# Patient Record
Sex: MALE | Race: BLACK OR AFRICAN AMERICAN | Employment: OTHER | URBAN - METROPOLITAN AREA
[De-identification: names, ages, dates, MRNs, and addresses within clinical notes are randomized per-mention and may not be internally consistent; named-entity substitution may affect disease eponyms.]

---

## 2017-02-07 PROBLEM — Z95.828 ARTERIOVENOUS GRAFT FOR HEMODIALYSIS IN PLACE, PRIMARY: Chronic | Status: ACTIVE | Noted: 2017-02-07

## 2017-02-13 ENCOUNTER — HOSPITAL ENCOUNTER (OUTPATIENT)
Dept: LAB | Age: 77
Discharge: HOME | End: 2017-02-13
Attending: UROLOGY
Payer: MEDICARE

## 2017-02-13 ENCOUNTER — HOSPITAL ENCOUNTER (OUTPATIENT)
Dept: CT IMAGING | Age: 77
Discharge: HOME | End: 2017-02-13
Attending: UROLOGY
Payer: MEDICARE

## 2017-02-13 DIAGNOSIS — R31.0 GROSS HEMATURIA: ICD-10-CM

## 2017-02-13 PROCEDURE — 74011000258 HC RX REV CODE- 258: Performed by: UROLOGY

## 2017-02-13 PROCEDURE — 74178 CT ABD&PLV WO CNTR FLWD CNTR: CPT

## 2017-02-13 PROCEDURE — 74011636320 HC RX REV CODE- 636/320: Performed by: UROLOGY

## 2017-02-13 RX ORDER — SODIUM CHLORIDE 0.9 % (FLUSH) 0.9 %
10 SYRINGE (ML) INJECTION
Status: COMPLETED | OUTPATIENT
Start: 2017-02-13 | End: 2017-02-13

## 2017-02-13 RX ADMIN — SODIUM CHLORIDE 100 ML: 900 INJECTION, SOLUTION INTRAVENOUS at 11:50

## 2017-02-13 RX ADMIN — IOVERSOL 100 ML: 741 INJECTION INTRA-ARTERIAL; INTRAVENOUS at 11:49

## 2017-02-13 RX ADMIN — Medication 10 ML: at 11:50

## 2017-09-27 ENCOUNTER — APPOINTMENT (RX ONLY)
Dept: URBAN - METROPOLITAN AREA CLINIC 349 | Facility: CLINIC | Age: 77
Setting detail: DERMATOLOGY
End: 2017-09-27

## 2017-09-27 DIAGNOSIS — L82.1 OTHER SEBORRHEIC KERATOSIS: ICD-10-CM

## 2017-09-27 DIAGNOSIS — L20.89 OTHER ATOPIC DERMATITIS: ICD-10-CM

## 2017-09-27 DIAGNOSIS — L85.3 XEROSIS CUTIS: ICD-10-CM

## 2017-09-27 PROBLEM — E13.9 OTHER SPECIFIED DIABETES MELLITUS WITHOUT COMPLICATIONS: Status: ACTIVE | Noted: 2017-09-27

## 2017-09-27 PROBLEM — L20.84 INTRINSIC (ALLERGIC) ECZEMA: Status: ACTIVE | Noted: 2017-09-27

## 2017-09-27 PROCEDURE — ? PRESCRIPTION

## 2017-09-27 PROCEDURE — 99213 OFFICE O/P EST LOW 20 MIN: CPT

## 2017-09-27 PROCEDURE — ? COUNSELING

## 2017-09-27 RX ORDER — TRIAMCINOLONE ACETONIDE 1 MG/G
CREAM TOPICAL
Qty: 1 | Refills: 8 | Status: ERX

## 2017-09-27 RX ORDER — AMMONIUM LACTATE 120 MG/G
LOTION TOPICAL
Qty: 1 | Refills: 6 | Status: ERX

## 2017-09-27 ASSESSMENT — LOCATION DETAILED DESCRIPTION DERM
LOCATION DETAILED: RIGHT MID-UPPER BACK
LOCATION DETAILED: LEFT SUPERIOR LATERAL UPPER BACK
LOCATION DETAILED: LEFT DISTAL POSTERIOR THIGH
LOCATION DETAILED: RIGHT DISTAL POSTERIOR UPPER ARM
LOCATION DETAILED: LEFT MID-UPPER BACK
LOCATION DETAILED: RIGHT SUPERIOR UPPER BACK

## 2017-09-27 ASSESSMENT — LOCATION ZONE DERM
LOCATION ZONE: TRUNK
LOCATION ZONE: LEG
LOCATION ZONE: ARM

## 2017-09-27 ASSESSMENT — LOCATION SIMPLE DESCRIPTION DERM
LOCATION SIMPLE: RIGHT UPPER ARM
LOCATION SIMPLE: LEFT POSTERIOR THIGH
LOCATION SIMPLE: LEFT UPPER BACK
LOCATION SIMPLE: RIGHT UPPER BACK

## 2018-01-01 ENCOUNTER — HOSPITAL ENCOUNTER (OUTPATIENT)
Dept: CT IMAGING | Age: 78
Discharge: HOME | End: 2018-12-14
Attending: INTERNAL MEDICINE
Payer: MEDICARE

## 2018-01-01 DIAGNOSIS — Z86.010 PERSONAL HISTORY OF COLONIC POLYPS: ICD-10-CM

## 2018-01-01 DIAGNOSIS — K56.2 VOLVULUS (HCC): ICD-10-CM

## 2018-01-01 DIAGNOSIS — Z53.8 PROCEDURE AND TREATMENT NOT CARRIED OUT FOR OTHER REASONS: ICD-10-CM

## 2018-01-01 PROCEDURE — 74261 CT COLONOGRAPHY DX: CPT

## 2018-06-18 PROBLEM — E11.3599 TYPE 2 DIABETES MELLITUS WITH PROLIFERATIVE RETINOPATHY (HCC): Status: ACTIVE | Noted: 2018-06-18

## 2018-08-29 ENCOUNTER — HOSPITAL ENCOUNTER (OUTPATIENT)
Dept: MRI IMAGING | Age: 78
Discharge: HOME | End: 2018-08-29
Attending: INTERNAL MEDICINE
Payer: MEDICARE

## 2018-08-29 DIAGNOSIS — F03.90 DEMENTIA WITHOUT BEHAVIORAL DISTURBANCE, UNSPECIFIED DEMENTIA TYPE: ICD-10-CM

## 2018-08-29 PROCEDURE — 70551 MRI BRAIN STEM W/O DYE: CPT

## 2018-08-30 NOTE — PROGRESS NOTES
MRI had changes of arteriosclerosis in small peripheral blood vessels and is common for age--no stroke or tumor

## 2018-09-26 ENCOUNTER — APPOINTMENT (RX ONLY)
Dept: URBAN - METROPOLITAN AREA CLINIC 349 | Facility: CLINIC | Age: 78
Setting detail: DERMATOLOGY
End: 2018-09-26

## 2018-09-26 DIAGNOSIS — L20.89 OTHER ATOPIC DERMATITIS: ICD-10-CM

## 2018-09-26 DIAGNOSIS — L82.1 OTHER SEBORRHEIC KERATOSIS: ICD-10-CM

## 2018-09-26 DIAGNOSIS — L85.3 XEROSIS CUTIS: ICD-10-CM

## 2018-09-26 DIAGNOSIS — D22 MELANOCYTIC NEVI: ICD-10-CM

## 2018-09-26 DIAGNOSIS — L82.0 INFLAMED SEBORRHEIC KERATOSIS: ICD-10-CM

## 2018-09-26 PROBLEM — D22.5 MELANOCYTIC NEVI OF TRUNK: Status: ACTIVE | Noted: 2018-09-26

## 2018-09-26 PROBLEM — I48.91 UNSPECIFIED ATRIAL FIBRILLATION: Status: ACTIVE | Noted: 2018-09-26

## 2018-09-26 PROBLEM — L20.84 INTRINSIC (ALLERGIC) ECZEMA: Status: ACTIVE | Noted: 2018-09-26

## 2018-09-26 PROCEDURE — 99213 OFFICE O/P EST LOW 20 MIN: CPT | Mod: 25

## 2018-09-26 PROCEDURE — ? PRESCRIPTION

## 2018-09-26 PROCEDURE — ? LIQUID NITROGEN

## 2018-09-26 PROCEDURE — A4550 SURGICAL TRAYS: HCPCS

## 2018-09-26 PROCEDURE — 11301 SHAVE SKIN LESION 0.6-1.0 CM: CPT | Mod: 59

## 2018-09-26 PROCEDURE — ? SHAVE REMOVAL

## 2018-09-26 PROCEDURE — 88305 TISSUE EXAM BY PATHOLOGIST: CPT

## 2018-09-26 PROCEDURE — ? COUNSELING

## 2018-09-26 PROCEDURE — ? PATHOLOGY BILLING

## 2018-09-26 PROCEDURE — 17110 DESTRUCTION B9 LES UP TO 14: CPT

## 2018-09-26 RX ORDER — TRIAMCINOLONE ACETONIDE 1 MG/G
CREAM TOPICAL
Qty: 1 | Refills: 8 | Status: ERX

## 2018-09-26 RX ORDER — AMMONIUM LACTATE 120 MG/G
LOTION TOPICAL
Qty: 1 | Refills: 6 | Status: ERX

## 2018-09-26 ASSESSMENT — LOCATION SIMPLE DESCRIPTION DERM
LOCATION SIMPLE: RIGHT UPPER ARM
LOCATION SIMPLE: RIGHT SUPERIOR MUCOSAL LIP
LOCATION SIMPLE: RIGHT SUPERIOR LIP
LOCATION SIMPLE: LEFT UPPER BACK
LOCATION SIMPLE: LEFT POSTERIOR THIGH
LOCATION SIMPLE: RIGHT UPPER BACK
LOCATION SIMPLE: LEFT UPPER BACK

## 2018-09-26 ASSESSMENT — LOCATION ZONE DERM
LOCATION ZONE: TRUNK
LOCATION ZONE: LEG
LOCATION ZONE: ARM
LOCATION ZONE: LIP
LOCATION ZONE: TRUNK
LOCATION ZONE: LIP

## 2018-09-26 ASSESSMENT — LOCATION DETAILED DESCRIPTION DERM
LOCATION DETAILED: RIGHT MID-UPPER BACK
LOCATION DETAILED: RIGHT DISTAL POSTERIOR UPPER ARM
LOCATION DETAILED: LEFT SUPERIOR UPPER BACK
LOCATION DETAILED: RIGHT SUPERIOR UPPER BACK
LOCATION DETAILED: RIGHT SUPERIOR VERMILION LIP
LOCATION DETAILED: RIGHT SUPERIOR MUCOSAL LIP
LOCATION DETAILED: LEFT SUPERIOR LATERAL UPPER BACK
LOCATION DETAILED: LEFT MID-UPPER BACK
LOCATION DETAILED: LEFT SUPERIOR UPPER BACK
LOCATION DETAILED: LEFT DISTAL POSTERIOR THIGH

## 2018-09-26 NOTE — PROCEDURE: LIQUID NITROGEN
Render Post-Care Instructions In Note?: no
Include Z78.9 (Other Specified Conditions Influencing Health Status) As An Associated Diagnosis?: Yes
Detail Level: Detailed
Number Of Freeze-Thaw Cycles: 1 freeze-thaw cycle
Post-Care Instructions: I reviewed with the patient in detail post-care instructions. Patient is to wear sunprotection, and avoid picking at any of the treated lesions. Pt may apply Vaseline to crusted or scabbing areas.
Medical Necessity Information: It is in your best interest to select a reason for this procedure from the list below. All of these items fulfill various CMS LCD requirements except the new and changing color options.
Consent: The patient's consent was obtained including but not limited to risks of crusting, scabbing, blistering, scarring, darker or lighter pigmentary change, recurrence, incomplete removal and infection.
Medical Necessity Clause: This procedure was medically necessary because the lesions that were treated were:

## 2018-09-26 NOTE — PROCEDURE: PATHOLOGY BILLING
Immunohistochemistry ([id number] and [id number]) billing is not performed here. Please use the Immunohistochemistry Stain Billing plan to accomplish this. Immunohistochemistry ([id number] and [id number]) billing is not performed here. Please use the Immunohistochemistry Stain Billing plan to accomplish this.

## 2018-09-26 NOTE — PROCEDURE: SHAVE REMOVAL
Was A Bandage Applied: Yes
Detail Level: Detailed
Anesthesia Type: 2% lidocaine with epinephrine
Hemostasis: Electrocautery
Biopsy Method: Dermablade
Wound Care: Vaseline
Size Of Lesion In Cm (Required): 0.8
X Size Of Lesion In Cm (Optional): 0
Post-Care Instructions: I reviewed with the patient in detail post-care instructions. Patient is to keep the biopsy site dry overnight, and then apply vaseline twice daily until healed. Patient may apply hydrogen peroxide soaks to remove any crusting. After the procedure, the patient was observed for 5-10 minutes and was oriented to person, place and time and demied feeling dizzy, queasy, and stated that they did not feel that they were going to faint.
Consent was obtained from the patient. The risks and benefits to therapy were discussed in detail. Specifically, the risks of infection, scarring, bleeding, prolonged wound healing, incomplete removal, allergy to anesthesia, nerve injury and recurrence were addressed. Prior to the procedure, the treatment site was clearly identified and confirmed by the patient. All components of Universal Protocol/PAUSE Rule completed.
Medical Necessity Information: It is in your best interest to select a reason for this procedure from the list below. All of these items fulfill various CMS LCD requirements except the new and changing color options.
Billing Type: Third-Party Bill
Accession #: [id number]
Medical Necessity Clause: This procedure was medically necessary because the lesion that was treated was: changing
Bill [id number] For Specimen Handling/Conveyance To Laboratory?: no
Anesthesia Volume In Cc: 0.5
Notification Instructions: Patient will be notified of biopsy results. However, patient instructed to call the office if not contacted within 2 weeks.

## 2018-10-29 ENCOUNTER — HOSPITAL ENCOUNTER (OUTPATIENT)
Dept: PHYSICAL THERAPY | Age: 78
Discharge: HOME | End: 2018-10-29
Attending: INTERNAL MEDICINE
Payer: MEDICARE

## 2018-10-29 DIAGNOSIS — R05.9 COUGH: ICD-10-CM

## 2018-10-29 PROCEDURE — G8996 SWALLOW CURRENT STATUS: HCPCS | Performed by: SPEECH-LANGUAGE PATHOLOGIST

## 2018-10-29 PROCEDURE — G8997 SWALLOW GOAL STATUS: HCPCS | Performed by: SPEECH-LANGUAGE PATHOLOGIST

## 2018-10-29 PROCEDURE — 92610 EVALUATE SWALLOWING FUNCTION: CPT | Performed by: SPEECH-LANGUAGE PATHOLOGIST

## 2018-10-29 NOTE — THERAPY EVALUATION
Domingo Reilly  : 1940  [place]  Secondary: Elia [address] [place] [address], [address], [place] [address].  [phone]   [phone]         OUTPATIENT SPEECH LANGUAGE PATHOLOGY: Initial Assessment  ICD-10: Treatment Diagnosis: Oropharyngeal Dysphagia R13.12  REFERRING PHYSICIAN: Ethel Gonzalez MD MD Orders: Evaluate and Treat  PAST MEDICAL HISTORY:   Mr. Lelia Perkins is a 69 y.o. male who  has a past medical history of Abnormal cardiovascular function study, Abnormal EKG, Allergic rhinitis, Anemia, ARF (acute renal failure) ([address] Atrial fibrillation ([address] Atrial fibrillation with rapid ventricular response ([address] CAD (Coronary Artery Disease), Native Coronary Artery, CABG x4, Cancer ([address] Chest pain, Chronic constipation, Chronic diastolic heart failure ([address] Chronic kidney disease, Cough, Diabetes mellitus with background retinopathy ([address] Diabetes Mellitus, insulin dependent, Dyspnea on exertion, Erectile dysfunction/impotence, Gastroenteritis, acute, Gastroesophageal reflux, Glaucoma, HTN (hypertension), Hyperlipidemia, ILD (interstitial lung disease) ([address] Long-term current use of steroids, Morbid obesity with body mass index of 45.0-49.9 in [facility]), Need for prophylactic antibiotic, ADLEN (Obstructive Sleep Apnea), uses home CPAP, Paroxysmal SVT/rapid atrial  (supraventricular tachycardia) ([address] Personal history of tobacco use, Pneumonia, Pneumonia, organism unspecified(486), Premature ventricular contraction, Reflux esophagitis - (GERD), Renal cancer ([address] Restrictive lung disease, Retinopathy, S/P CABG (coronary artery bypass graft), Status post kidney transplant, Transplant, kidney, Transplanted kidney, and URI (upper respiratory infection).   He also  has a past surgical history that includes hx cataract removal (, ); hx retinal detachment repair (, ); hx vascular access (1995); pr cabg, artery-vein, four (2002); pr transplant kidney+recip nephrec (6/12/1996); hx urological; hx amputation (2005); ANESTHESIA FOR ESOPHAGOGASTRODUODENOSCOPY (N/A, 5/9/2012); and NEPHRECTOMY LAPAROSCOPIC HAND ASSISTED (Left, 6/2/2010). MEDICAL/REFERRING DIAGNOSIS: Cough [R05]  DATE OF ONSET: 3-4 months   PRIOR LEVEL OF FUNCTION: requires assistance  PRECAUTIONS/ALLERGIES: Patient has no known allergies. ASSESSMENT:  Patient is a 69year old male who was referred for Dysphagia evaluation due coughing. He is accompanied by his wife who provides most of the case history. She reports that patient has been coughing with food and liquid for about 3-4 months. She indicated that it first started with Lasix increase and patient started losing weight. She stated that she called patient's physician who told patient to decrease the Lasix as long as his weight stayed below 350lbs. She reports that he's been given cough medicine in the past for these coughing episodes. Just a few weeks ago, he coughed up some red tinged sputum that too her looked like the cough syrup and not blood. She reports that he coughs more at dinner and at night and less with lunch. He does have sleep apnea in which he wears a CPAP, occasional allergies in which he will occasionally take Claritin in addition to occasionally using the inhaler for wheezing. He does have GERD in which he currently takes Nexium. She reports that he does have some memory loss. She does hear a \"rattle' in his throat at times and states that the rattle improves if he throat clears  Based on the objective data described below, the patient presents with some clinical s/sx of Dysphagia. OME was completed and revealed redness along pharyngeal wall. Decreased lingual lateralization and ROM to the left. He was given three Dysphagia assessment tools.  He was given the Eating Assessment Tool (EAT-10) which helps measure swallowing problems with ranges from 0=no problem and 4=severe problem. A score greater than 3 would indicate a swallowing problem. He scored an 8. Then he was given the Cough Severity Index (CSI) which allows patient's to describe their cough and the effect of their coughing on their lives with a range from 0-never to 4=always. A total score of 3 or below is considered normal and higher than 3, your cough is impacting your quality of life. He scored an 8. Lastly, he was given Reflux Symptom Index which allows to measure if swallowing symptoms are indicative of reflux. Rating scale ranges from 0-5, with 0= no problem, 5= severe. RSI of greater than or equal to 13 in clinically significant therefore a RSI >13 may be indicative of significant reflux disease. He scored an 25 which would indicate reflux. Patient was then given trials of thin liquids via cup, puree, mixed and solids. (+) changes in vocal quality observed with thin liquids via cup. Cued throat clear helped improve. Prolonged mastication observed with puree, mixed and solids with lingual residue requiring liquid was observed with solids. Given all the above assessment tools in addition to clinical bedside, he would benefit from MBSS to further assess his swallow function for proper diet and or strategies. ST does feel that some of his symptoms could be reflux related and maybe that Nexium is no longer effective for patient. That being said, after MBSS he would benefit from GI consult for GERD management. Patient currently sees Dr. Lisa Blanco and wife believes he has a follow up in January. Also patient recently diagnosed with memory issues and that as well could effect is overall swallow function as well. Goals will be added and or updated pending MBSS results and recommendations. Wife and patient aware of the above aforementioned and all are in agreement. Also reviewed standard reflux precautions. All verbalized understanding.      Patient will benefit from skilled intervention to address the below impairments. ?????? ? ? This section established at most recent assessment??????????  PROBLEM LIST (Impairments causing functional limitations):  1. Dysphagia  GOALS: (Goals have been discussed and agreed upon with patient.)  SHORT-TERM FUNCTIONAL GOALS: Time Frame: 3 months  Patient will complete MBSS at 100% participation. Patient will complete laryngeal exercises with Min A with 85% accuracy  DISCHARGE GOALS: Time Frame: 12 weeks  1. Patient will tolerate least restrictive diet without signs/symptoms of aspiration at discharge for safe swallow function. '  REHABILITATION POTENTIAL FOR STATED GOALS: [address]:  Patient will benefit from skilled intervention to address the following impairments. RECOMMENDATIONS AND PLANNED INTERVENTIONS (Benefits and precautions of therapy have been discussed with the patient.):  · continue prescribed diet  MEDICATIONS:  · With liquid  COMPENSATORY STRATEGIES/MODIFICATIONS INCLUDING:  · Small sips and bites  · Sit upright at 90 degrees  OTHER RECOMMENDATIONS (including follow up treatment recommendations): · Family training/education  · Laryngeal exercises  · MBSS  RECOMMENDED DIET MODIFICATIONS DISCUSSED WITH:  · Family  · Patient  TREATMENT PLAN EFFECTIVE DATES: 10/29/2018 TO 1/27/2019 (90 days). FREQUENCY/DURATION: Continue to follow patient 1 time a week for 90 days to address above goals. Regarding Shawna Ariza's therapy, I certify that the treatment plan above will be carried out by a therapist or under their direction. Thank you for this referral,  HALIMA Renteria Ed CCC-SLP                  Referring Physician Signature: Sundar Soares MD    Date      SUBJECTIVE:  Alert  Present Symptoms: Dysphagia    Pain Intensity 1: 0  Current Medications:   Current Outpatient Medications on File Prior to Encounter   Medication Sig Dispense Refill    donepezil (ARICEPT) 10 mg tablet Take 1 Tab by mouth nightly.  30 Tab 6    HYDROcodone-homatropine (HYCODAN) 5-1.5 mg/5 mL syrup Take 5 mL by mouth three (3) times daily as needed.  aspirin delayed-release 81 mg tablet Take 81 mg by mouth daily.  ALPHAGAN P 0.1 % ophthalmic solution Administer 1 Drop to right eye two (2) times a day.  dorzolamide-timolol (COSOPT) 22.3-6.8 mg/mL ophthalmic solution Administer 1 Drop to both eyes two (2) times a day.  albuterol (PROAIR HFA) 90 mcg/actuation inhaler Take 2 Puffs by inhalation every four (4) hours as needed for Wheezing. 1 Inhaler 11    dilTIAZem CD (CARDIZEM CD) 120 mg ER capsule Take 1 Cap by mouth daily. 30 Cap 5    atorvastatin (LIPITOR) 40 mg tablet Take 1 Tab by mouth nightly. 30 Tab 5    Insulin Syringe-Needle U-100 1 mL 31 gauge x 5/16 syrg Strength: 1 cc, short needle ; Form: .; SIG: take as directed  4 x per day      glucose blood VI test strips (ONETOUCH ULTRA TEST) strip To check blood sugar 4 x per day. Dx E11.42      azelastine (ASTELIN) 137 mcg (0.1 %) nasal spray 1 [place] by Both Nostrils route two (2) times a day. Use in each nostril as directed (Patient taking differently: 1 Spray by Both Nostrils route two (2) times daily as needed. Use in each nostril as directed) 1 Bottle 11    apixaban (ELIQUIS) 5 mg tablet Take 1 Tab by mouth two (2) times a day. 60 Tab 11    sotalol (BETAPACE) 120 mg tablet Take 1 Tab by mouth every twelve (12) hours. 180 Tab 3    amLODIPine (NORVASC) 2.5 mg tablet Take 1 Tab by mouth daily. 90 Tab 3    insulin lispro (HUMALOG) 100 unit/mL injection 20/34/44 and sliding scale      NEXIUM 40 mg capsule TAKE 1 CAPSULE (40MG) BY ORAL ROUTE EVERY AM BEFORE A MEAL (INS WILL COVER 4/10/17)  6    furosemide (LASIX) 80 mg tablet Take 1 Tab by mouth daily. Alternating with 2 (Patient taking differently: Take 80 mg by mouth two (2) times a day.) 45 Tab 11    cycloSPORINE modified (NEORAL) 100 mg capsule Take 1 Cap by mouth two (2) times a day.  HOLD TODAY'S (8/27/16) DOSES (Patient taking differently: Take 100 mg by mouth two (2) times a day.) 1 Cap 0    ferrous sulfate 325 mg (65 mg iron) tablet Take 65 mg by mouth Daily (before breakfast). Indications: three times weekly MWF      multivitamin (ONE A DAY) tablet Strength: Multiple Vitamins; Form: tablet; SIG: take 1 tab(s) orally once a day      polyethylene glycol (MIRALAX) 17 gram packet Take 17 g by mouth every Monday, Wednesday, Friday.  cpap machine kit by Does Not Apply route. 17 cm qhs      acetaminophen (TYLENOL EXTRA STRENGTH) 500 mg tablet Take 1,000 mg by mouth every six (6) hours as needed for Pain.  cycloSPORINE modified (NEORAL) 25 mg capsule Take 25 mg by mouth daily.  loratadine (CLARITIN) 10 mg tablet Take 10 mg by mouth daily as needed.  mycophenolate sodium (MYFORTIC) 180 mg EC tablet Take 180 mg by mouth two (2) times a day.  CALCIUM CARBONATE/VITAMIN D3 (CALCIUM 600 + D PO) Take 1 Tab by mouth daily.  captopril (CAPOTEN) 50 mg tablet Take 50 mg by mouth three (3) times daily.  predniSONE (DELTASONE) 10 mg tablet Take 10 mg by mouth daily (with breakfast).  insulin glargine (LANTUS) 100 unit/mL injection 54 Units by SubCUTAneous route two (2) times a day. No current facility-administered medications on file prior to encounter. Date Last Reviewed: 10/29/18  Current Dietary Status:  Regular      History of reflux:  YES    Reflux medication:Nexium  Social History/Home Situation:       Work/Activity History: Disabled    OBJECTIVE:  Objective Measure: Tool Used: National Outcomes Measurement System: Functional Communication Measures: SWALLOWING  Score:  Initial: 5 Most Recent: X (Date: -- )   Interpretation of Tool: This measure describes the change in functional communication status subsequent to speech-language pathology treatment of patients with dysphagia.  o Level 1:  Individual is not able to swallow anything safely by mouth.  All nutrition and hydration is received through non-oral means (e.g., nasogastric tube, PEG). o Level 2: Individual is not able to swallow safely by mouth for nutrition and hydration, but may take some consistency with consistent maximal cues in therapy only. Alternative method of feeding required. o Level 3:  Alternative method of feeding required as individual takes less than 50% of nutrition and hydration by mouth, and/or swallowing is safe with consistent use of moderate cues to use compensatory strategies and/or requires maximum diet restriction. o Level 4:  Swallowing is safe, but usually requires moderate cues to use compensatory strategies, and/or the individual has moderate diet restrictions and/or still requires tube feeding and/or oral supplements. o Level 5:  Swallowing is safe with minimal diet restriction and/or occasionally requires minimal cueing to use compensatory strategies. The individual may occasionally self-cue. All nutrition and hydration needs are met by mouth at mealtime. o Level 6:  Swallowing is safe, and the individual eats and drinks independently and may rarely require minimal cueing. The individual usually self-cues when difficulty occurs. May need to avoid specific food items (e.g., popcorn and nuts), or require additional time (due to dysphagia). o Level 7: The individuals ability to eat independently is not limited by swallow function. Swallowing would be safe and efficient for all consistencies. Compensatory strategies are effectively used when needed. Score Level 7 Level 6 Level 5 Level 4 Level 3 Level 2 Level 1   Modifier CH CI CJ CK CL CM CN   ?  Swallowing:     - CURRENT STATUS: CJ - 20%-39% impaired, limited or restricted    - GOAL STATUS:  CH - 0% impaired, limited or restricted    - D/C STATUS:  ---------------To be determined---------------    Respiratory Status:      Room Air  CXR Results:Clear  MRI/CT Results:N/A  Oral Motor Structure/Speech:  Oral-Motor Structure/Motor Speech  Labial: No impairment  Dentition: Upper dentures, Natural  Oral Hygiene: fair  Lingual: Decreased rate  Velum: Palate (comment)(decreased)  Mandible: No impairment    Cognitive and Communication Status:  Neurologic State: Alert  Orientation Level: Oriented to person;Disoriented to situation  Cognition: Follows commands;Memory loss  Perception: Appears intact  Perseveration: No perseveration noted  Safety/Judgement: Not assessed    BEDSIDE SWALLOW EVALUATION  Oral Assessment:  Oral Assessment  Labial: No impairment  Dentition: Upper dentures; Natural  Oral Hygiene: fair  Lingual: Decreased rate  Velum: Palate (comment)(decreased)  Mandible: No impairment  Gag Reflex: Hyperactive  P.O. Trials:  Patient Position: upright in chair    The patient was given teaspoon to tablespoon   amounts of the following:   Consistency Presented: Mixed consistency;Puree; Solid; Thin liquid  How Presented: Self-fed/presented;Cup/sip;Spoon;Straw;Successive swallows    ORAL PHASE:  Bolus Acceptance: No impairment  Bolus Formation/Control: Impaired  Propulsion: Delayed (# of seconds)  Type of Impairment: Mastication(prolonged)  Oral Residue: Less than 10% of bolus; Lingual(with solids only)    PHARYNGEAL PHASE:  Initiation of Swallow: Delayed (# of seconds)  Laryngeal Elevation: Weak  Aspiration Signs/Symptoms: Change vocal quality  Vocal Quality: No impairment           Pharyngeal Phase Characteristics: Double swallow;Easily fatigued ; Suspected pharyngeal residue;Poor endurance    OTHER OBSERVATIONS:  Rate/bite size: WNL   Endurance:  Questionable   Coments:      TREATMENT:    (In addition to Assessment/Re-Assessment sessions the following treatments were rendered)  Assessment/Reassessment only, no treatment provided today        LARYNGEAL / PHARYNGEAL EXERCISES: __________________________________________________________________________________________________  Treatment Assessment:  Dysphagia evaluation completed. Progression/Medical Necessity:   · Skilled intervention continues to be required due to clinical s/sx of Dysphagia. Compliance with Program/Exercises: Will assess as treatment progresses. Reason for Continuation of Services/Other Comments:  · Patient continues to require skilled intervention due to clinical s/sx of Dysphagia . Recommendations/Intent for next treatment session: \"Treatment next visit will focus on goals\". Total Treatment Duration:  Time In: 1300  Time Out: Antonina Triana. Rosalind Marrero

## 2018-10-29 NOTE — PROGRESS NOTES
Outpatient Rehab Services  Referral Form/Physician Order  Central Scheduling Fax: [phone]  Speak to a :  Call [phone]   Please review and co-sign (electronically) OR sign and return to the below indicated clinic's fax number if you agree with this request.  Thank you! NAME:  Krista Lee SSN:  [id number] :  1940   ADDRESS:  [address] [place] [address] 2 Daytime Phone:  [phone] (home)There is no such number on file (mobile). Diagnosis & Date of Onset:  Cough [R05] Special Precautions:   Frequency:  [] to be determined by therapist after evaluation   OR   ____ X per week X ____ weeks    Services    [] Evaluate & Treat  [x] Special Orders_________MBSS_______________   [] Physical Therapy  [] Occupational Therapy   [] Speech Therapy  [x] MBS w/ Speech Therapy    Specialized Programs    [] Aquatic Therapy [] Lymphedema [] Oncology Rehab   [] Balance Rehab [] Parkinson's Program [] Osteoporosis   [] Fibromyalgia [] Motion Analysis [] Spine Rehab   [] Industrial Rehab [] Hand & Upper Extremity [] Sports Injury Rehab    [] Urinary Incontinence     Locations    @ [address] [place] [address] [address], [address]  [place] [address] [address] [facility]  Ph: [phone]  Fax: [phone] @ [address] [place], [place] [address] [facility],[address]  [place], [address]  Ph: [phone]  Fax: [phone] @ [address], [address] [place] [address]  Ph: [phone]  Fax: [phone]        @ [address] [place] [address] [address]  Ph: [phone]  Fax: [phone] @ [address] [place], [place] [address], [address]   Ph: [phone]  Fax: [phone] @ [address] [place] [address]  [address]  [place], [address]  Ph: [phone]  Fax: [phone]        @ [address] [place] [address]  Ph: [phone]  Fax: [phone] @ [address], [address] [place] [address]  Ph: [phone]  Fax: [phone] @ [address], [address], [address]   Ph: [phone]         @ [address] [place] [address], [address] [place] [address]  Ph: [phone]  Fax: [phone]      This section is not needed if signing electronically   I certify that I have examined the above patient and outpatient rehab services are medically necessary.    ___________________________________________           Signature of Physician/Provider    ___________________________________________            Practice Name   ______________________   Date    ______________________   Referral Contact

## 2018-11-08 NOTE — THERAPY EVALUATION
Ritu Worthy  : 1940  [place]  Secondary: Elia Smith 75 at [facility], [facility], [address]  [phone]   [phone]        OUTPATIENT SPEECH LANGUAGE PATHOLOGY: MODIFIED BARIUM SWALLOW    ICD-10: Treatment Diagnosis: dysphagia, oropharyngeal 13.12  DATE: 2018  REFERRING PHYSICIAN: Jesús Michelle MD MD Orders: modified   PAST MEDICAL HISTORY:    Mr. Deana Gutierrez is a 69 y.o. male who  has a past medical history of Abnormal cardiovascular function study, Abnormal EKG, Allergic rhinitis, Anemia, ARF (acute renal failure) ([facility]), Atrial fibrillation ([address] Atrial fibrillation with rapid ventricular response ([address] CAD (Coronary Artery Disease), Native Coronary Artery, CABG x4, Cancer ([address] Chest pain, Chronic constipation, Chronic diastolic heart failure ([address] Chronic kidney disease, Cough, Diabetes mellitus with background retinopathy ([address] Diabetes Mellitus, insulin dependent, Dyspnea on exertion, Erectile dysfunction/impotence, Gastroenteritis, acute, Gastroesophageal reflux, Glaucoma, HTN (hypertension), Hyperlipidemia, ILD (interstitial lung disease) ([address] Long-term current use of steroids, Morbid obesity with body mass index of 45.0-49.9 in [facility]), Need for prophylactic antibiotic, ALDEN (Obstructive Sleep Apnea), uses home CPAP, Paroxysmal SVT/rapid atrial  (supraventricular tachycardia) ([address] Personal history of tobacco use, Pneumonia, Pneumonia, organism unspecified(486), Premature ventricular contraction, Reflux esophagitis - (GERD), Renal cancer ([address] Restrictive lung disease, Retinopathy, S/P CABG (coronary artery bypass graft), Status post kidney transplant, Transplant, kidney, Transplanted kidney, and URI (upper respiratory infection).   He also  has a past surgical history that includes hx cataract removal (, ); hx retinal detachment repair (, ); hx vascular access (1995); pr cabg, artery-vein, four (2002); pr transplant kidney+recip nephrec (6/12/1996); hx urological; hx amputation (2005); ANESTHESIA FOR ESOPHAGOGASTRODUODENOSCOPY (N/A, 5/9/2012); and NEPHRECTOMY LAPAROSCOPIC HAND ASSISTED (Left, 6/2/2010). MEDICAL/REFERRING DIAGNOSIS: Dysphagia, unspecified [R13.10]  DATE OF ONSET: June 2018  PRIOR LEVEL OF FUNCTION: with spouse  PRECAUTIONS/ALLERGIES: Patient has no allergy information on record. ASSESSMENT/PLAN OF CARE:  Pt's spouse reported pt has had a cough since June. She reported he eats after finishing meals at times. Based on the objective data described below, the patient presents with min oropharyngeal dysphagia. Premature spillage occurred to the valleculae with pudding and to the pyriform sinuses with all remaining consistencies. Max delays were with chewable textures and were 7-8 seconds. Deep, transient penetration occurred during the swallow with thin and nectar thick liquids via cup and straw. Penetration was not as deep with thin via straw as compared to thin via cup. However, straw sips of nectar were deep. No penetration with honey regardless of presentation. A bolus hold and effortful swallow with thin via cup did not affect penetration. No aspiration observed. No pharyngeal residue. Recommend continuing with a regular diet as penetration was transient. Recommend ST for 1-2 sessions to establish a home exercise program.  Discussed with pt and his wife with verbal understanding expressed. Patient will benefit from skilled intervention to address the above impairments.     RECOMMENDATIONS AND PLANNED INTERVENTIONS (Benefits and precautions of therapy have been discussed with the patient.):  · continue prescribed diet  MEDICATIONS:  · With liquid  COMPENSATORY STRATEGIES/MODIFICATIONS INCLUDING:  · None  OTHER RECOMMENDATIONS (including follow up treatment recommendations):   · Laryngeal exercises    Thank you for this referral,  RENETTA Sexton, CCC-SLP            SUBJECTIVE:  Pt cooperative. Spouse present. Present Symptoms: coughing after meals       Current Dietary Status:  Regular    Radiologist: Dr. Ruby Hdez  History of reflux:  [x]YES     []NO      Reflux medication: Nexium   Social History/Home Situation: residing with spouse      Work/Activity History: retired     OBJECTIVE:  Objective Measure: Tool Used: National Outcomes Measurement System: Functional Communication Measures: SWALLOWING  Score:  Initial: 6 Most Recent: X (Date: -- )   Interpretation of Tool: This measure describes the change in functional communication status subsequent to speech-language pathology treatment of patients with dysphagia.  o Level 1:  Individual is not able to swallow anything safely by mouth. All nutrition and hydration is received through non-oral means (e.g., nasogastric tube, PEG). o Level 2: Individual is not able to swallow safely by mouth for nutrition and hydration, but may take some consistency with consistent maximal cues in therapy only. Alternative method of feeding required. o Level 3:  Alternative method of feeding required as individual takes less than 50% of nutrition and hydration by mouth, and/or swallowing is safe with consistent use of moderate cues to use compensatory strategies and/or requires maximum diet restriction. o Level 4:  Swallowing is safe, but usually requires moderate cues to use compensatory strategies, and/or the individual has moderate diet restrictions and/or still requires tube feeding and/or oral supplements. o Level 5:  Swallowing is safe with minimal diet restriction and/or occasionally requires minimal cueing to use compensatory strategies. The individual may occasionally self-cue. All nutrition and hydration needs are met by mouth at mealtime. o Level 6:  Swallowing is safe, and the individual eats and drinks independently and may rarely require minimal cueing.  The individual usually self-cues when difficulty occurs. May need to avoid specific food items (e.g., popcorn and nuts), or require additional time (due to dysphagia). o Level 7: The individuals ability to eat independently is not limited by swallow function. Swallowing would be safe and efficient for all consistencies. Compensatory strategies are effectively used when needed. Score Level 7 Level 6 Level 5 Level 4 Level 3 Level 2 Level 1   Modifier CH CI CJ CK CL CM CN   ?  Swallowing:     - CURRENT STATUS: CI - 1%-19% impaired, limited or restricted    - GOAL STATUS:  CH - 0% impaired, limited or restricted    - D/C STATUS:  CI - 1%-19% impaired, limited or restricted      Cognitive/Communication Status:  Mental Status  Neurologic State: Alert    Oral Assessment:  Oral Assessment  Dentition: Upper dentures(natural lower teeth)  Oral Hygiene: adequate    Vocal Quality: no impairment     Patient Viewed: Patient Position: upright in chair  Film Views: C-Arm, Lateral, Fluoro    Oral Prepatory:  The patient was given the following: Consistency Presented: Honey thick liquid, Mixed consistency, Nectar thick liquid, Pudding, Solid, Thin liquid  How Presented: Self-fed/presented, SLP-fed/presented, Cup/sip, Spoon, Straw, Successive swallows    Oral Phase:  Bolus Acceptance: No impairment  Bolus Formation/Control: Impaired  Propulsion: No impairment  Type of Impairment: Premature spillage  Oral Residue: None  Initiation of Swallow: Triggered at vallecula, Triggered at pyriform sinus(es)  Oral Phase Severity: Minimal    Pharyngeal Phase:  Timing: Pooling 1-5 sec, Pooling 6-10 sec  Decreased Tongue Base Retraction?: No  Laryngeal Elevation: WFL (within functional limits)  Penetration: Flash/transient  Aspiration/Timing: No evidence of aspiration  Aspiration/Penetration Score: 2 (Penetration/No residue-Contrast enters the airway penetrates, remains above the folds/cords, and is cleared)  Pharyngeal Symmetry: Not assessed  Pharyngeal Dysfunction: None  Pharyngeal Phase Severity: Minimal  Pharyngeal-Esophageal Segment: No impairment    Assessment only;  No treatment(s) provided today  __________________________________________________________________________________________________  Recommendations for treatment: laryngeal exercises  Total Treatment Duration:  Time In: 0922   Time Out: 31 RENETTA Howard, CCC-SLP

## 2018-11-09 ENCOUNTER — HOSPITAL ENCOUNTER (OUTPATIENT)
Dept: GENERAL RADIOLOGY | Age: 78
Discharge: HOME | End: 2018-11-09
Payer: MEDICARE

## 2018-11-09 DIAGNOSIS — R13.10 DYSPHAGIA, UNSPECIFIED: ICD-10-CM

## 2018-11-09 DIAGNOSIS — R13.12 OROPHARYNGEAL DYSPHAGIA: ICD-10-CM

## 2018-11-09 PROCEDURE — G8997 SWALLOW GOAL STATUS: HCPCS

## 2018-11-09 PROCEDURE — G8998 SWALLOW D/C STATUS: HCPCS

## 2018-11-09 PROCEDURE — 92611 MOTION FLUOROSCOPY/SWALLOW: CPT

## 2018-11-09 PROCEDURE — G8996 SWALLOW CURRENT STATUS: HCPCS

## 2018-11-09 PROCEDURE — 74011000255 HC RX REV CODE- 255: Performed by: INTERNAL MEDICINE

## 2018-11-09 PROCEDURE — 74230 X-RAY XM SWLNG FUNCJ C+: CPT

## 2018-11-09 RX ADMIN — BARIUM SULFATE 20 ML: 400 SUSPENSION ORAL at 10:00

## 2018-11-09 RX ADMIN — BARIUM SULFATE 15 ML: 400 PASTE ORAL at 10:01

## 2018-11-09 RX ADMIN — BARIUM SULFATE 20 ML: 400 SUSPENSION ORAL at 09:59

## 2018-11-09 RX ADMIN — BARIUM SULFATE 30 ML: 980 POWDER, FOR SUSPENSION ORAL at 10:00

## 2018-12-19 PROBLEM — D50.9 IRON DEFICIENCY ANEMIA: Status: ACTIVE | Noted: 2018-01-01

## 2018-12-19 PROBLEM — R93.3 ABNORMAL CT SCAN, COLON: Status: ACTIVE | Noted: 2018-01-01

## 2018-12-19 PROBLEM — Z12.11 SPECIAL SCREENING FOR MALIGNANT NEOPLASMS, COLON: Status: ACTIVE | Noted: 2018-01-01

## 2018-12-19 PROBLEM — R68.89 OTHER GENERAL SYMPTOMS AND SIGNS: Status: RESOLVED | Noted: 2018-01-01 | Resolved: 2018-01-01

## 2018-12-19 PROBLEM — K56.2 VOLVULUS (HCC): Status: ACTIVE | Noted: 2018-01-01

## 2018-12-19 PROBLEM — A04.72 ENTEROCOLITIS DUE TO CLOSTRIDIUM DIFFICILE: Status: ACTIVE | Noted: 2018-01-01

## 2018-12-19 PROBLEM — D13.1 BENIGN NEOPLASM OF STOMACH: Status: ACTIVE | Noted: 2018-01-01

## 2018-12-19 PROBLEM — K31.7 POLYP OF STOMACH AND DUODENUM: Status: ACTIVE | Noted: 2018-01-01

## 2018-12-19 PROBLEM — A04.72 ENTEROCOLITIS DUE TO CLOSTRIDIUM DIFFICILE: Status: RESOLVED | Noted: 2018-01-01 | Resolved: 2018-01-01

## 2018-12-19 PROBLEM — R68.89 OTHER GENERAL SYMPTOMS AND SIGNS: Status: ACTIVE | Noted: 2018-01-01

## 2018-12-19 PROBLEM — E66.01 OBESITY, MORBID (HCC): Status: ACTIVE | Noted: 2018-01-01

## 2018-12-19 PROBLEM — Z79.01 LONG TERM CURRENT USE OF ANTICOAGULANT: Status: ACTIVE | Noted: 2018-01-01

## 2019-01-01 ENCOUNTER — PATIENT OUTREACH (OUTPATIENT)
Dept: CASE MANAGEMENT | Age: 79
End: 2019-01-01

## 2019-01-01 ENCOUNTER — HOSPITAL ENCOUNTER (INPATIENT)
Age: 79
LOS: 3 days | Discharge: NURSING, REHAB OR LONG-TERM CARE | DRG: 698 | End: 2019-09-18
Attending: EMERGENCY MEDICINE | Admitting: INTERNAL MEDICINE
Payer: MEDICARE

## 2019-01-01 ENCOUNTER — APPOINTMENT (OUTPATIENT)
Dept: GENERAL RADIOLOGY | Age: 79
DRG: 698 | End: 2019-01-01
Attending: INTERNAL MEDICINE
Payer: MEDICARE

## 2019-01-01 ENCOUNTER — APPOINTMENT (OUTPATIENT)
Dept: GENERAL RADIOLOGY | Age: 79
DRG: 698 | End: 2019-01-01
Attending: NURSE PRACTITIONER
Payer: MEDICARE

## 2019-01-01 ENCOUNTER — APPOINTMENT (OUTPATIENT)
Dept: CT IMAGING | Age: 79
DRG: 698 | End: 2019-01-01
Attending: NURSE PRACTITIONER
Payer: MEDICARE

## 2019-01-01 ENCOUNTER — HOSPITAL ENCOUNTER (OUTPATIENT)
Dept: GENERAL RADIOLOGY | Age: 79
Discharge: HOME | End: 2019-06-04
Attending: INTERNAL MEDICINE

## 2019-01-01 ENCOUNTER — HOSPITAL ENCOUNTER (OUTPATIENT)
Dept: LAB | Age: 79
Discharge: HOME | End: 2019-06-04
Attending: INTERNAL MEDICINE
Payer: MEDICARE

## 2019-01-01 ENCOUNTER — APPOINTMENT (OUTPATIENT)
Dept: GENERAL RADIOLOGY | Age: 79
DRG: 698 | End: 2019-01-01
Attending: EMERGENCY MEDICINE
Payer: MEDICARE

## 2019-01-01 VITALS
HEIGHT: 74 IN | RESPIRATION RATE: 18 BRPM | WEIGHT: 315 LBS | DIASTOLIC BLOOD PRESSURE: 67 MMHG | SYSTOLIC BLOOD PRESSURE: 161 MMHG | HEART RATE: 60 BPM | OXYGEN SATURATION: 90 % | BODY MASS INDEX: 40.43 KG/M2 | TEMPERATURE: 97.5 F

## 2019-01-01 DIAGNOSIS — I50.32 CHRONIC DIASTOLIC HEART FAILURE (HCC): ICD-10-CM

## 2019-01-01 DIAGNOSIS — I50.9 ACUTE ON CHRONIC CONGESTIVE HEART FAILURE, UNSPECIFIED HEART FAILURE TYPE (HCC): ICD-10-CM

## 2019-01-01 DIAGNOSIS — N39.0 URINARY TRACT INFECTION WITH HEMATURIA, SITE UNSPECIFIED: Primary | ICD-10-CM

## 2019-01-01 DIAGNOSIS — Z94.0 TRANSPLANTED KIDNEY: ICD-10-CM

## 2019-01-01 DIAGNOSIS — R31.9 URINARY TRACT INFECTION WITH HEMATURIA, SITE UNSPECIFIED: Primary | ICD-10-CM

## 2019-01-01 DIAGNOSIS — N17.9 ACUTE RENAL FAILURE SUPERIMPOSED ON CHRONIC KIDNEY DISEASE, UNSPECIFIED CKD STAGE, UNSPECIFIED ACUTE RENAL FAILURE TYPE (HCC): ICD-10-CM

## 2019-01-01 DIAGNOSIS — I48.0 PAROXYSMAL ATRIAL FIBRILLATION (HCC): ICD-10-CM

## 2019-01-01 DIAGNOSIS — R06.02 SOB (SHORTNESS OF BREATH): ICD-10-CM

## 2019-01-01 DIAGNOSIS — N18.9 ACUTE RENAL FAILURE SUPERIMPOSED ON CHRONIC KIDNEY DISEASE, UNSPECIFIED CKD STAGE, UNSPECIFIED ACUTE RENAL FAILURE TYPE (HCC): ICD-10-CM

## 2019-01-01 DIAGNOSIS — I10 ESSENTIAL HYPERTENSION: ICD-10-CM

## 2019-01-01 DIAGNOSIS — R09.02 HYPOXIA: ICD-10-CM

## 2019-01-01 LAB
ABO + RH BLD: NORMAL
ALBUMIN SERPL-MCNC: 2.6 G/DL (ref 3.2–4.6)
ALBUMIN SERPL-MCNC: 3 G/DL (ref 3.2–4.6)
ALBUMIN/GLOB SERPL: 0.7 {RATIO} (ref 1.2–3.5)
ALBUMIN/GLOB SERPL: 0.8 {RATIO} (ref 1.2–3.5)
ALP SERPL-CCNC: 60 U/L (ref 50–136)
ALP SERPL-CCNC: 67 U/L (ref 50–136)
ALT SERPL-CCNC: 13 U/L (ref 12–65)
ALT SERPL-CCNC: 16 U/L (ref 12–65)
ANION GAP SERPL CALC-SCNC: 5 MMOL/L (ref 7–16)
ANION GAP SERPL CALC-SCNC: 7 MMOL/L (ref 7–16)
ANION GAP SERPL CALC-SCNC: 7 MMOL/L (ref 7–16)
ANION GAP SERPL CALC-SCNC: 8 MMOL/L (ref 7–16)
ANION GAP SERPL CALC-SCNC: 8 MMOL/L (ref 7–16)
APTT PPP: 29 SEC (ref 24.7–39.8)
AST SERPL-CCNC: 13 U/L (ref 15–37)
AST SERPL-CCNC: 18 U/L (ref 15–37)
ATRIAL RATE: 277 BPM
BACTERIA SPEC CULT: ABNORMAL
BACTERIA SPEC CULT: NORMAL
BACTERIA URNS QL MICRO: ABNORMAL /HPF
BASOPHILS # BLD: 0 K/UL (ref 0–0.2)
BASOPHILS NFR BLD: 0 % (ref 0–2)
BILIRUB SERPL-MCNC: 0.4 MG/DL (ref 0.2–1.1)
BILIRUB SERPL-MCNC: 0.5 MG/DL (ref 0.2–1.1)
BLOOD GROUP ANTIBODIES SERPL: NORMAL
BNP SERPL-MCNC: 393 PG/ML
BNP SERPL-MCNC: 496 PG/ML
BNP SERPL-MCNC: 508 PG/ML
BUN SERPL-MCNC: 25 MG/DL (ref 8–23)
BUN SERPL-MCNC: 26 MG/DL (ref 8–23)
BUN SERPL-MCNC: 28 MG/DL (ref 8–23)
BUN SERPL-MCNC: 32 MG/DL (ref 8–23)
BUN SERPL-MCNC: 34 MG/DL (ref 8–23)
CALCIUM SERPL-MCNC: 9 MG/DL (ref 8.3–10.4)
CALCIUM SERPL-MCNC: 9.1 MG/DL (ref 8.3–10.4)
CALCIUM SERPL-MCNC: 9.3 MG/DL (ref 8.3–10.4)
CALCIUM SERPL-MCNC: 9.4 MG/DL (ref 8.3–10.4)
CALCIUM SERPL-MCNC: 9.7 MG/DL (ref 8.3–10.4)
CALCULATED R AXIS, ECG10: -8 DEGREES
CALCULATED T AXIS, ECG11: 171 DEGREES
CASTS URNS QL MICRO: ABNORMAL /LPF
CHLORIDE SERPL-SCNC: 107 MMOL/L (ref 98–107)
CHLORIDE SERPL-SCNC: 108 MMOL/L (ref 98–107)
CHLORIDE SERPL-SCNC: 110 MMOL/L (ref 98–107)
CO2 SERPL-SCNC: 29 MMOL/L (ref 21–32)
CO2 SERPL-SCNC: 30 MMOL/L (ref 21–32)
CO2 SERPL-SCNC: 31 MMOL/L (ref 21–32)
CREAT SERPL-MCNC: 1.36 MG/DL (ref 0.8–1.5)
CREAT SERPL-MCNC: 1.5 MG/DL (ref 0.8–1.5)
CREAT SERPL-MCNC: 1.52 MG/DL (ref 0.8–1.5)
CREAT SERPL-MCNC: 1.56 MG/DL (ref 0.8–1.5)
CREAT SERPL-MCNC: 1.62 MG/DL (ref 0.8–1.5)
DIAGNOSIS, 93000: NORMAL
DIFFERENTIAL METHOD BLD: ABNORMAL
EOSINOPHIL # BLD: 0 K/UL (ref 0–0.8)
EOSINOPHIL # BLD: 0.1 K/UL (ref 0–0.8)
EOSINOPHIL NFR BLD: 0 % (ref 0.5–7.8)
EOSINOPHIL NFR BLD: 1 % (ref 0.5–7.8)
EPI CELLS #/AREA URNS HPF: ABNORMAL /HPF
ERYTHROCYTE [DISTWIDTH] IN BLOOD BY AUTOMATED COUNT: 18.6 % (ref 11.9–14.6)
ERYTHROCYTE [DISTWIDTH] IN BLOOD BY AUTOMATED COUNT: 18.8 % (ref 11.9–14.6)
ERYTHROCYTE [DISTWIDTH] IN BLOOD BY AUTOMATED COUNT: 18.8 % (ref 11.9–14.6)
ERYTHROCYTE [DISTWIDTH] IN BLOOD BY AUTOMATED COUNT: 18.9 % (ref 11.9–14.6)
ERYTHROCYTE [DISTWIDTH] IN BLOOD BY AUTOMATED COUNT: 19.2 % (ref 11.9–14.6)
GLOBULIN SER CALC-MCNC: 3.8 G/DL (ref 2.3–3.5)
GLOBULIN SER CALC-MCNC: 4 G/DL (ref 2.3–3.5)
GLUCOSE BLD STRIP.AUTO-MCNC: 120 MG/DL (ref 65–100)
GLUCOSE BLD STRIP.AUTO-MCNC: 168 MG/DL (ref 65–100)
GLUCOSE BLD STRIP.AUTO-MCNC: 179 MG/DL (ref 65–100)
GLUCOSE BLD STRIP.AUTO-MCNC: 183 MG/DL (ref 65–100)
GLUCOSE BLD STRIP.AUTO-MCNC: 219 MG/DL (ref 65–100)
GLUCOSE BLD STRIP.AUTO-MCNC: 229 MG/DL (ref 65–100)
GLUCOSE BLD STRIP.AUTO-MCNC: 240 MG/DL (ref 65–100)
GLUCOSE BLD STRIP.AUTO-MCNC: 242 MG/DL (ref 65–100)
GLUCOSE BLD STRIP.AUTO-MCNC: 254 MG/DL (ref 65–100)
GLUCOSE BLD STRIP.AUTO-MCNC: 260 MG/DL (ref 65–100)
GLUCOSE BLD STRIP.AUTO-MCNC: 306 MG/DL (ref 65–100)
GLUCOSE BLD STRIP.AUTO-MCNC: 312 MG/DL (ref 65–100)
GLUCOSE SERPL-MCNC: 154 MG/DL (ref 65–100)
GLUCOSE SERPL-MCNC: 191 MG/DL (ref 65–100)
GLUCOSE SERPL-MCNC: 236 MG/DL (ref 65–100)
GLUCOSE SERPL-MCNC: 62 MG/DL (ref 65–100)
GLUCOSE SERPL-MCNC: 93 MG/DL (ref 65–100)
GRAM STN SPEC: NORMAL
HCT VFR BLD AUTO: 34.7 % (ref 41.1–50.3)
HCT VFR BLD AUTO: 35.4 % (ref 41.1–50.3)
HCT VFR BLD AUTO: 35.7 % (ref 41.1–50.3)
HCT VFR BLD AUTO: 36.5 % (ref 41.1–50.3)
HCT VFR BLD AUTO: 38 % (ref 41.1–50.3)
HGB BLD-MCNC: 10.6 G/DL (ref 13.6–17.2)
HGB BLD-MCNC: 10.7 G/DL (ref 13.6–17.2)
HGB BLD-MCNC: 10.8 G/DL (ref 13.6–17.2)
HGB BLD-MCNC: 11 G/DL (ref 13.6–17.2)
HGB BLD-MCNC: 11.3 G/DL (ref 13.6–17.2)
IMM GRANULOCYTES # BLD AUTO: 0 K/UL (ref 0–0.5)
IMM GRANULOCYTES # BLD AUTO: 0.1 K/UL (ref 0–0.5)
IMM GRANULOCYTES NFR BLD AUTO: 0 % (ref 0–5)
IMM GRANULOCYTES NFR BLD AUTO: 1 % (ref 0–5)
INR PPP: 1.2
LACTATE BLD-SCNC: 1.46 MMOL/L (ref 0.5–1.9)
LACTATE SERPL-SCNC: 1.1 MMOL/L (ref 0.4–2)
LYMPHOCYTES # BLD: 0.6 K/UL (ref 0.5–4.6)
LYMPHOCYTES # BLD: 1.1 K/UL (ref 0.5–4.6)
LYMPHOCYTES # BLD: 1.2 K/UL (ref 0.5–4.6)
LYMPHOCYTES # BLD: 1.2 K/UL (ref 0.5–4.6)
LYMPHOCYTES # BLD: 1.6 K/UL (ref 0.5–4.6)
LYMPHOCYTES NFR BLD: 13 % (ref 13–44)
LYMPHOCYTES NFR BLD: 14 % (ref 13–44)
LYMPHOCYTES NFR BLD: 14 % (ref 13–44)
LYMPHOCYTES NFR BLD: 5 % (ref 13–44)
LYMPHOCYTES NFR BLD: 9 % (ref 13–44)
MAGNESIUM SERPL-MCNC: 2.2 MG/DL (ref 1.8–2.4)
MAGNESIUM SERPL-MCNC: 2.3 MG/DL (ref 1.8–2.4)
MAGNESIUM SERPL-MCNC: 2.4 MG/DL (ref 1.8–2.4)
MCH RBC QN AUTO: 27.9 PG (ref 26.1–32.9)
MCH RBC QN AUTO: 28 PG (ref 26.1–32.9)
MCH RBC QN AUTO: 28.1 PG (ref 26.1–32.9)
MCH RBC QN AUTO: 28.3 PG (ref 26.1–32.9)
MCH RBC QN AUTO: 28.4 PG (ref 26.1–32.9)
MCHC RBC AUTO-ENTMCNC: 29.7 G/DL (ref 31.4–35)
MCHC RBC AUTO-ENTMCNC: 30.1 G/DL (ref 31.4–35)
MCHC RBC AUTO-ENTMCNC: 30.2 G/DL (ref 31.4–35)
MCHC RBC AUTO-ENTMCNC: 30.3 G/DL (ref 31.4–35)
MCHC RBC AUTO-ENTMCNC: 30.5 G/DL (ref 31.4–35)
MCV RBC AUTO: 92 FL (ref 79.6–97.8)
MCV RBC AUTO: 92.2 FL (ref 79.6–97.8)
MCV RBC AUTO: 93.5 FL (ref 79.6–97.8)
MCV RBC AUTO: 94.1 FL (ref 79.6–97.8)
MCV RBC AUTO: 94.3 FL (ref 79.6–97.8)
MM INDURATION POC: 0 MM (ref 0–5)
MM INDURATION POC: NORMAL (ref 0–5)
MONOCYTES # BLD: 1 K/UL (ref 0.1–1.3)
MONOCYTES # BLD: 1.1 K/UL (ref 0.1–1.3)
MONOCYTES # BLD: 1.4 K/UL (ref 0.1–1.3)
MONOCYTES NFR BLD: 10 % (ref 4–12)
MONOCYTES NFR BLD: 10 % (ref 4–12)
MONOCYTES NFR BLD: 11 % (ref 4–12)
NEUTS SEG # BLD: 10.1 K/UL (ref 1.7–8.2)
NEUTS SEG # BLD: 6.5 K/UL (ref 1.7–8.2)
NEUTS SEG # BLD: 7.4 K/UL (ref 1.7–8.2)
NEUTS SEG # BLD: 8.5 K/UL (ref 1.7–8.2)
NEUTS SEG # BLD: 9.7 K/UL (ref 1.7–8.2)
NEUTS SEG NFR BLD: 74 % (ref 43–78)
NEUTS SEG NFR BLD: 75 % (ref 43–78)
NEUTS SEG NFR BLD: 75 % (ref 43–78)
NEUTS SEG NFR BLD: 80 % (ref 43–78)
NEUTS SEG NFR BLD: 84 % (ref 43–78)
NRBC # BLD: 0 K/UL (ref 0–0.2)
PLATELET # BLD AUTO: 233 K/UL (ref 150–450)
PLATELET # BLD AUTO: 239 K/UL (ref 150–450)
PLATELET # BLD AUTO: 239 K/UL (ref 150–450)
PLATELET # BLD AUTO: 242 K/UL (ref 150–450)
PLATELET # BLD AUTO: 280 K/UL (ref 150–450)
PMV BLD AUTO: 10.2 FL (ref 9.4–12.3)
PMV BLD AUTO: 10.4 FL (ref 9.4–12.3)
PMV BLD AUTO: 10.4 FL (ref 9.4–12.3)
PMV BLD AUTO: 10.5 FL (ref 9.4–12.3)
PMV BLD AUTO: 10.9 FL (ref 9.4–12.3)
POTASSIUM SERPL-SCNC: 3.7 MMOL/L (ref 3.5–5.1)
POTASSIUM SERPL-SCNC: 3.7 MMOL/L (ref 3.5–5.1)
POTASSIUM SERPL-SCNC: 3.9 MMOL/L (ref 3.5–5.1)
POTASSIUM SERPL-SCNC: 3.9 MMOL/L (ref 3.5–5.1)
POTASSIUM SERPL-SCNC: 4 MMOL/L (ref 3.5–5.1)
PPD POC: NEGATIVE NEGATIVE
PPD POC: NORMAL
PROT SERPL-MCNC: 6.4 G/DL (ref 6.3–8.2)
PROT SERPL-MCNC: 7 G/DL (ref 6.3–8.2)
PROTHROMBIN TIME: 15.6 SEC (ref 11.7–14.5)
Q-T INTERVAL, ECG07: 436 MS
QRS DURATION, ECG06: 130 MS
QTC CALCULATION (BEZET), ECG08: 473 MS
RBC # BLD AUTO: 3.77 M/UL (ref 4.23–5.6)
RBC # BLD AUTO: 3.82 M/UL (ref 4.23–5.6)
RBC # BLD AUTO: 3.84 M/UL (ref 4.23–5.6)
RBC # BLD AUTO: 3.88 M/UL (ref 4.23–5.6)
RBC # BLD AUTO: 4.03 M/UL (ref 4.23–5.6)
RBC #/AREA URNS HPF: >100 /HPF
SERVICE CMNT-IMP: ABNORMAL
SERVICE CMNT-IMP: NORMAL
SERVICE CMNT-IMP: NORMAL
SODIUM SERPL-SCNC: 144 MMOL/L (ref 136–145)
SODIUM SERPL-SCNC: 144 MMOL/L (ref 136–145)
SODIUM SERPL-SCNC: 146 MMOL/L (ref 136–145)
SODIUM SERPL-SCNC: 148 MMOL/L (ref 136–145)
SODIUM SERPL-SCNC: 148 MMOL/L (ref 136–145)
SPECIMEN EXP DATE BLD: NORMAL
TROPONIN I SERPL-MCNC: 0.04 NG/ML (ref 0.02–0.05)
TROPONIN I SERPL-MCNC: 0.07 NG/ML (ref 0.02–0.05)
TSH SERPL DL<=0.005 MIU/L-ACNC: 1.16 UIU/ML (ref 0.36–3.74)
VENTRICULAR RATE, ECG03: 71 BPM
WBC # BLD AUTO: 11.3 K/UL (ref 4.3–11.1)
WBC # BLD AUTO: 11.5 K/UL (ref 4.3–11.1)
WBC # BLD AUTO: 12.6 K/UL (ref 4.3–11.1)
WBC # BLD AUTO: 8.8 K/UL (ref 4.3–11.1)
WBC # BLD AUTO: 9.9 K/UL (ref 4.3–11.1)
WBC URNS QL MICRO: >100 /HPF

## 2019-01-01 PROCEDURE — 74011250636 HC RX REV CODE- 250/636: Performed by: INTERNAL MEDICINE

## 2019-01-01 PROCEDURE — 94760 N-INVAS EAR/PLS OXIMETRY 1: CPT

## 2019-01-01 PROCEDURE — 74011636637 HC RX REV CODE- 636/637: Performed by: NURSE PRACTITIONER

## 2019-01-01 PROCEDURE — 82962 GLUCOSE BLOOD TEST: CPT

## 2019-01-01 PROCEDURE — 74011000302 HC RX REV CODE- 302: Performed by: INTERNAL MEDICINE

## 2019-01-01 PROCEDURE — 74011250636 HC RX REV CODE- 250/636: Performed by: NURSE PRACTITIONER

## 2019-01-01 PROCEDURE — 97161 PT EVAL LOW COMPLEX 20 MIN: CPT

## 2019-01-01 PROCEDURE — 83735 ASSAY OF MAGNESIUM: CPT

## 2019-01-01 PROCEDURE — 85025 COMPLETE CBC W/AUTO DIFF WBC: CPT

## 2019-01-01 PROCEDURE — 83880 ASSAY OF NATRIURETIC PEPTIDE: CPT

## 2019-01-01 PROCEDURE — 86580 TB INTRADERMAL TEST: CPT | Performed by: INTERNAL MEDICINE

## 2019-01-01 PROCEDURE — 74011000250 HC RX REV CODE- 250: Performed by: NURSE PRACTITIONER

## 2019-01-01 PROCEDURE — 83605 ASSAY OF LACTIC ACID: CPT

## 2019-01-01 PROCEDURE — 74011250637 HC RX REV CODE- 250/637: Performed by: NURSE PRACTITIONER

## 2019-01-01 PROCEDURE — 65270000029 HC RM PRIVATE

## 2019-01-01 PROCEDURE — 81015 MICROSCOPIC EXAM OF URINE: CPT

## 2019-01-01 PROCEDURE — 81003 URINALYSIS AUTO W/O SCOPE: CPT | Performed by: EMERGENCY MEDICINE

## 2019-01-01 PROCEDURE — 97530 THERAPEUTIC ACTIVITIES: CPT

## 2019-01-01 PROCEDURE — 94640 AIRWAY INHALATION TREATMENT: CPT

## 2019-01-01 PROCEDURE — 36415 COLL VENOUS BLD VENIPUNCTURE: CPT

## 2019-01-01 PROCEDURE — 93005 ELECTROCARDIOGRAM TRACING: CPT | Performed by: EMERGENCY MEDICINE

## 2019-01-01 PROCEDURE — 74011000250 HC RX REV CODE- 250: Performed by: INTERNAL MEDICINE

## 2019-01-01 PROCEDURE — 77030018846 HC SOL IRR STRL H20 ICUM -A

## 2019-01-01 PROCEDURE — 85610 PROTHROMBIN TIME: CPT

## 2019-01-01 PROCEDURE — 84484 ASSAY OF TROPONIN QUANT: CPT

## 2019-01-01 PROCEDURE — 87205 SMEAR GRAM STAIN: CPT

## 2019-01-01 PROCEDURE — 87186 SC STD MICRODIL/AGAR DIL: CPT

## 2019-01-01 PROCEDURE — 77010033678 HC OXYGEN DAILY

## 2019-01-01 PROCEDURE — 85730 THROMBOPLASTIN TIME PARTIAL: CPT

## 2019-01-01 PROCEDURE — 71045 X-RAY EXAM CHEST 1 VIEW: CPT

## 2019-01-01 PROCEDURE — 74011000258 HC RX REV CODE- 258: Performed by: INTERNAL MEDICINE

## 2019-01-01 PROCEDURE — 80053 COMPREHEN METABOLIC PANEL: CPT

## 2019-01-01 PROCEDURE — 87088 URINE BACTERIA CULTURE: CPT

## 2019-01-01 PROCEDURE — 94761 N-INVAS EAR/PLS OXIMETRY MLT: CPT | Performed by: EMERGENCY MEDICINE

## 2019-01-01 PROCEDURE — 74011636637 HC RX REV CODE- 636/637: Performed by: INTERNAL MEDICINE

## 2019-01-01 PROCEDURE — 80048 BASIC METABOLIC PNL TOTAL CA: CPT

## 2019-01-01 PROCEDURE — 87086 URINE CULTURE/COLONY COUNT: CPT

## 2019-01-01 PROCEDURE — 74011000258 HC RX REV CODE- 258: Performed by: EMERGENCY MEDICINE

## 2019-01-01 PROCEDURE — 90686 IIV4 VACC NO PRSV 0.5 ML IM: CPT | Performed by: INTERNAL MEDICINE

## 2019-01-01 PROCEDURE — 86900 BLOOD TYPING SEROLOGIC ABO: CPT

## 2019-01-01 PROCEDURE — 74011250636 HC RX REV CODE- 250/636: Performed by: EMERGENCY MEDICINE

## 2019-01-01 PROCEDURE — 74176 CT ABD & PELVIS W/O CONTRAST: CPT

## 2019-01-01 PROCEDURE — 99285 EMERGENCY DEPT VISIT HI MDM: CPT | Performed by: EMERGENCY MEDICINE

## 2019-01-01 PROCEDURE — 90471 IMMUNIZATION ADMIN: CPT

## 2019-01-01 PROCEDURE — 84443 ASSAY THYROID STIM HORMONE: CPT

## 2019-01-01 PROCEDURE — 97166 OT EVAL MOD COMPLEX 45 MIN: CPT

## 2019-01-01 PROCEDURE — 87040 BLOOD CULTURE FOR BACTERIA: CPT

## 2019-01-01 RX ORDER — ONDANSETRON 2 MG/ML
4 INJECTION INTRAMUSCULAR; INTRAVENOUS
Status: DISCONTINUED | OUTPATIENT
Start: 2019-01-01 | End: 2019-01-01 | Stop reason: HOSPADM

## 2019-01-01 RX ORDER — INSULIN LISPRO 100 [IU]/ML
28 INJECTION, SOLUTION INTRAVENOUS; SUBCUTANEOUS
Qty: 1 VIAL | Refills: 0 | Status: SHIPPED
Start: 2019-01-01

## 2019-01-01 RX ORDER — INSULIN LISPRO 100 [IU]/ML
22 INJECTION, SOLUTION INTRAVENOUS; SUBCUTANEOUS
Status: DISCONTINUED | OUTPATIENT
Start: 2019-01-01 | End: 2019-01-01 | Stop reason: HOSPADM

## 2019-01-01 RX ORDER — CEPHALEXIN 500 MG/1
500 CAPSULE ORAL EVERY 6 HOURS
Status: DISCONTINUED | OUTPATIENT
Start: 2019-01-01 | End: 2019-01-01 | Stop reason: HOSPADM

## 2019-01-01 RX ORDER — UREA 10 %
2 LOTION (ML) TOPICAL 2 TIMES DAILY
Qty: 14 TAB | Refills: 0 | Status: SHIPPED | OUTPATIENT
Start: 2019-01-01

## 2019-01-01 RX ORDER — CEPHALEXIN 500 MG/1
500 CAPSULE ORAL EVERY 6 HOURS
Qty: 24 CAP | Refills: 0 | Status: SHIPPED | OUTPATIENT
Start: 2019-01-01 | End: 2019-01-01

## 2019-01-01 RX ORDER — GUAIFENESIN 600 MG/1
600 TABLET, EXTENDED RELEASE ORAL EVERY 12 HOURS
Qty: 20 TAB | Refills: 0 | Status: SHIPPED | OUTPATIENT
Start: 2019-01-01

## 2019-01-01 RX ORDER — DONEPEZIL HYDROCHLORIDE 5 MG/1
10 TABLET, FILM COATED ORAL
Status: DISCONTINUED | OUTPATIENT
Start: 2019-01-01 | End: 2019-01-01 | Stop reason: SDUPTHER

## 2019-01-01 RX ORDER — HYDRALAZINE HYDROCHLORIDE 25 MG/1
50 TABLET, FILM COATED ORAL 2 TIMES DAILY
Status: DISCONTINUED | OUTPATIENT
Start: 2019-01-01 | End: 2019-01-01 | Stop reason: HOSPADM

## 2019-01-01 RX ORDER — GUAIFENESIN 600 MG/1
600 TABLET, EXTENDED RELEASE ORAL EVERY 12 HOURS
Status: DISCONTINUED | OUTPATIENT
Start: 2019-01-01 | End: 2019-01-01 | Stop reason: HOSPADM

## 2019-01-01 RX ORDER — CYCLOSPORINE 25 MG/1
25 CAPSULE, LIQUID FILLED ORAL DAILY
Status: DISCONTINUED | OUTPATIENT
Start: 2019-01-01 | End: 2019-01-01 | Stop reason: HOSPADM

## 2019-01-01 RX ORDER — DONEPEZIL HYDROCHLORIDE 10 MG/1
10 TABLET, FILM COATED ORAL
Status: DISCONTINUED | OUTPATIENT
Start: 2019-01-01 | End: 2019-01-01 | Stop reason: HOSPADM

## 2019-01-01 RX ORDER — SODIUM CHLORIDE 0.9 % (FLUSH) 0.9 %
5-40 SYRINGE (ML) INJECTION AS NEEDED
Status: DISCONTINUED | OUTPATIENT
Start: 2019-01-01 | End: 2019-01-01 | Stop reason: HOSPADM

## 2019-01-01 RX ORDER — CYCLOSPORINE 100 MG/1
100 CAPSULE, LIQUID FILLED ORAL 2 TIMES DAILY
Status: DISCONTINUED | OUTPATIENT
Start: 2019-01-01 | End: 2019-01-01 | Stop reason: HOSPADM

## 2019-01-01 RX ORDER — SODIUM CHLORIDE 0.9 % (FLUSH) 0.9 %
5-40 SYRINGE (ML) INJECTION EVERY 8 HOURS
Status: DISCONTINUED | OUTPATIENT
Start: 2019-01-01 | End: 2019-01-01 | Stop reason: HOSPADM

## 2019-01-01 RX ORDER — INSULIN LISPRO 100 [IU]/ML
INJECTION, SOLUTION INTRAVENOUS; SUBCUTANEOUS
Status: DISCONTINUED | OUTPATIENT
Start: 2019-01-01 | End: 2019-01-01 | Stop reason: HOSPADM

## 2019-01-01 RX ORDER — INSULIN LISPRO 100 [IU]/ML
28 INJECTION, SOLUTION INTRAVENOUS; SUBCUTANEOUS
Status: DISCONTINUED | OUTPATIENT
Start: 2019-01-01 | End: 2019-01-01 | Stop reason: HOSPADM

## 2019-01-01 RX ORDER — ACETAMINOPHEN 325 MG/1
650 TABLET ORAL
Status: DISCONTINUED | OUTPATIENT
Start: 2019-01-01 | End: 2019-01-01 | Stop reason: HOSPADM

## 2019-01-01 RX ORDER — DORZOLAMIDE HYDROCHLORIDE AND TIMOLOL MALEATE 20; 5 MG/ML; MG/ML
1 SOLUTION/ DROPS OPHTHALMIC 2 TIMES DAILY
Status: DISCONTINUED | OUTPATIENT
Start: 2019-01-01 | End: 2019-01-01 | Stop reason: HOSPADM

## 2019-01-01 RX ORDER — FUROSEMIDE 10 MG/ML
60 INJECTION INTRAMUSCULAR; INTRAVENOUS 2 TIMES DAILY
Status: DISCONTINUED | OUTPATIENT
Start: 2019-01-01 | End: 2019-01-01

## 2019-01-01 RX ORDER — LANOLIN ALCOHOL/MO/W.PET/CERES
325 CREAM (GRAM) TOPICAL DAILY
Status: DISCONTINUED | OUTPATIENT
Start: 2019-01-01 | End: 2019-01-01 | Stop reason: HOSPADM

## 2019-01-01 RX ORDER — ATORVASTATIN CALCIUM 40 MG/1
40 TABLET, FILM COATED ORAL
Status: DISCONTINUED | OUTPATIENT
Start: 2019-01-01 | End: 2019-01-01 | Stop reason: HOSPADM

## 2019-01-01 RX ORDER — FUROSEMIDE 40 MG/1
80 TABLET ORAL 2 TIMES DAILY
Status: DISCONTINUED | OUTPATIENT
Start: 2019-01-01 | End: 2019-01-01 | Stop reason: HOSPADM

## 2019-01-01 RX ORDER — UREA 10 %
2 LOTION (ML) TOPICAL 2 TIMES DAILY
Status: DISCONTINUED | OUTPATIENT
Start: 2019-01-01 | End: 2019-01-01 | Stop reason: HOSPADM

## 2019-01-01 RX ORDER — INSULIN LISPRO 100 [IU]/ML
22 INJECTION, SOLUTION INTRAVENOUS; SUBCUTANEOUS
Qty: 1 VIAL | Refills: 0 | Status: SHIPPED
Start: 2019-01-01

## 2019-01-01 RX ORDER — INSULIN GLARGINE 100 [IU]/ML
45 INJECTION, SOLUTION SUBCUTANEOUS 2 TIMES DAILY
Status: DISCONTINUED | OUTPATIENT
Start: 2019-01-01 | End: 2019-01-01 | Stop reason: HOSPADM

## 2019-01-01 RX ORDER — MYCOPHENOLIC ACID 180 MG/1
180 TABLET, DELAYED RELEASE ORAL 2 TIMES DAILY
Status: DISCONTINUED | OUTPATIENT
Start: 2019-01-01 | End: 2019-01-01 | Stop reason: HOSPADM

## 2019-01-01 RX ORDER — INSULIN GLARGINE 100 [IU]/ML
45 INJECTION, SOLUTION SUBCUTANEOUS
Qty: 1 VIAL | Refills: 0 | Status: SHIPPED | OUTPATIENT
Start: 2019-01-01

## 2019-01-01 RX ORDER — INSULIN LISPRO 100 [IU]/ML
12 INJECTION, SOLUTION INTRAVENOUS; SUBCUTANEOUS
Qty: 1 VIAL | Refills: 0 | Status: SHIPPED
Start: 2019-01-01

## 2019-01-01 RX ORDER — ALBUTEROL SULFATE 0.83 MG/ML
2.5 SOLUTION RESPIRATORY (INHALATION)
Status: DISCONTINUED | OUTPATIENT
Start: 2019-01-01 | End: 2019-01-01 | Stop reason: HOSPADM

## 2019-01-01 RX ORDER — CYCLOSPORINE 25 MG/1
100 CAPSULE, LIQUID FILLED ORAL 2 TIMES DAILY
Status: DISCONTINUED | OUTPATIENT
Start: 2019-01-01 | End: 2019-01-01 | Stop reason: SDUPTHER

## 2019-01-01 RX ORDER — CAPTOPRIL 25 MG/1
50 TABLET ORAL 3 TIMES DAILY
Status: DISCONTINUED | OUTPATIENT
Start: 2019-01-01 | End: 2019-01-01 | Stop reason: HOSPADM

## 2019-01-01 RX ORDER — INSULIN LISPRO 100 [IU]/ML
6 INJECTION, SOLUTION INTRAVENOUS; SUBCUTANEOUS ONCE
Status: COMPLETED | OUTPATIENT
Start: 2019-01-01 | End: 2019-01-01

## 2019-01-01 RX ORDER — FUROSEMIDE 40 MG/1
80 TABLET ORAL 2 TIMES DAILY
Status: CANCELLED | OUTPATIENT
Start: 2019-01-01

## 2019-01-01 RX ORDER — DILTIAZEM HYDROCHLORIDE 180 MG/1
180 CAPSULE, COATED, EXTENDED RELEASE ORAL DAILY
Status: DISCONTINUED | OUTPATIENT
Start: 2019-01-01 | End: 2019-01-01 | Stop reason: HOSPADM

## 2019-01-01 RX ORDER — BUDESONIDE 0.5 MG/2ML
500 INHALANT ORAL
Status: DISCONTINUED | OUTPATIENT
Start: 2019-01-01 | End: 2019-01-01 | Stop reason: HOSPADM

## 2019-01-01 RX ORDER — ASPIRIN 81 MG/1
81 TABLET ORAL DAILY
Status: DISCONTINUED | OUTPATIENT
Start: 2019-01-01 | End: 2019-01-01 | Stop reason: HOSPADM

## 2019-01-01 RX ORDER — PREDNISONE 10 MG/1
10 TABLET ORAL
Status: DISCONTINUED | OUTPATIENT
Start: 2019-01-01 | End: 2019-01-01 | Stop reason: HOSPADM

## 2019-01-01 RX ORDER — SOTALOL HYDROCHLORIDE 80 MG/1
120 TABLET ORAL EVERY 12 HOURS
Status: DISCONTINUED | OUTPATIENT
Start: 2019-01-01 | End: 2019-01-01 | Stop reason: HOSPADM

## 2019-01-01 RX ORDER — ESOMEPRAZOLE MAGNESIUM 40 MG/1
40 CAPSULE, DELAYED RELEASE ORAL DAILY
Status: DISCONTINUED | OUTPATIENT
Start: 2019-01-01 | End: 2019-01-01 | Stop reason: HOSPADM

## 2019-01-01 RX ORDER — INSULIN LISPRO 100 [IU]/ML
12 INJECTION, SOLUTION INTRAVENOUS; SUBCUTANEOUS
Status: DISCONTINUED | OUTPATIENT
Start: 2019-01-01 | End: 2019-01-01 | Stop reason: HOSPADM

## 2019-01-01 RX ORDER — FUROSEMIDE 40 MG/1
80 TABLET ORAL 2 TIMES DAILY
Qty: 60 TAB | Refills: 0 | Status: SHIPPED | OUTPATIENT
Start: 2019-01-01

## 2019-01-01 RX ADMIN — INSULIN LISPRO 4 UNITS: 100 INJECTION, SOLUTION INTRAVENOUS; SUBCUTANEOUS at 17:19

## 2019-01-01 RX ADMIN — Medication 10 ML: at 06:02

## 2019-01-01 RX ADMIN — FERROUS SULFATE TAB 325 MG (65 MG ELEMENTAL FE) 325 MG: 325 (65 FE) TAB at 08:38

## 2019-01-01 RX ADMIN — Medication 10 ML: at 21:13

## 2019-01-01 RX ADMIN — SOTALOL HYDROCHLORIDE 120 MG: 80 TABLET ORAL at 22:25

## 2019-01-01 RX ADMIN — INSULIN LISPRO 3 UNITS: 100 INJECTION, SOLUTION INTRAVENOUS; SUBCUTANEOUS at 17:28

## 2019-01-01 RX ADMIN — MYCOPHENOLIC ACID 180 MG: 180 TABLET, DELAYED RELEASE ORAL at 08:19

## 2019-01-01 RX ADMIN — BUDESONIDE 500 MCG: 0.5 INHALANT RESPIRATORY (INHALATION) at 07:34

## 2019-01-01 RX ADMIN — BUDESONIDE 500 MCG: 0.5 INHALANT RESPIRATORY (INHALATION) at 20:07

## 2019-01-01 RX ADMIN — CAPTOPRIL 50 MG: 25 TABLET ORAL at 21:04

## 2019-01-01 RX ADMIN — ATORVASTATIN CALCIUM 40 MG: 40 TABLET, FILM COATED ORAL at 21:04

## 2019-01-01 RX ADMIN — CAPTOPRIL 50 MG: 25 TABLET ORAL at 08:19

## 2019-01-01 RX ADMIN — INSULIN LISPRO 2 UNITS: 100 INJECTION, SOLUTION INTRAVENOUS; SUBCUTANEOUS at 21:11

## 2019-01-01 RX ADMIN — CYCLOSPORINE 100 MG: 100 CAPSULE, LIQUID FILLED ORAL at 08:47

## 2019-01-01 RX ADMIN — ALBUTEROL SULFATE 2.5 MG: 2.5 SOLUTION RESPIRATORY (INHALATION) at 07:34

## 2019-01-01 RX ADMIN — CAPTOPRIL 50 MG: 25 TABLET ORAL at 17:24

## 2019-01-01 RX ADMIN — HYDRALAZINE HYDROCHLORIDE 50 MG: 25 TABLET, FILM COATED ORAL at 08:43

## 2019-01-01 RX ADMIN — Medication 10 ML: at 14:50

## 2019-01-01 RX ADMIN — MYCOPHENOLIC ACID 180 MG: 180 TABLET, DELAYED RELEASE ORAL at 17:24

## 2019-01-01 RX ADMIN — ATORVASTATIN CALCIUM 40 MG: 40 TABLET, FILM COATED ORAL at 22:25

## 2019-01-01 RX ADMIN — ALBUTEROL SULFATE 2.5 MG: 2.5 SOLUTION RESPIRATORY (INHALATION) at 19:32

## 2019-01-01 RX ADMIN — DONEPEZIL HYDROCHLORIDE 10 MG: 10 TABLET, FILM COATED ORAL at 21:11

## 2019-01-01 RX ADMIN — INSULIN GLARGINE 45 UNITS: 100 INJECTION, SOLUTION SUBCUTANEOUS at 08:39

## 2019-01-01 RX ADMIN — DORZOLAMIDE HYDROCHLORIDE AND TIMOLOL MALEATE 1 DROP: 20; 5 SOLUTION/ DROPS OPHTHALMIC at 17:25

## 2019-01-01 RX ADMIN — HYDRALAZINE HYDROCHLORIDE 50 MG: 25 TABLET, FILM COATED ORAL at 21:12

## 2019-01-01 RX ADMIN — ALBUTEROL SULFATE 2.5 MG: 2.5 SOLUTION RESPIRATORY (INHALATION) at 19:40

## 2019-01-01 RX ADMIN — Medication 10 ML: at 13:28

## 2019-01-01 RX ADMIN — CEFTRIAXONE 2 G: 2 INJECTION, POWDER, FOR SOLUTION INTRAMUSCULAR; INTRAVENOUS at 16:28

## 2019-01-01 RX ADMIN — INSULIN LISPRO 4 UNITS: 100 INJECTION, SOLUTION INTRAVENOUS; SUBCUTANEOUS at 21:05

## 2019-01-01 RX ADMIN — SOTALOL HYDROCHLORIDE 120 MG: 80 TABLET ORAL at 08:41

## 2019-01-01 RX ADMIN — ALBUTEROL SULFATE 2.5 MG: 2.5 SOLUTION RESPIRATORY (INHALATION) at 14:46

## 2019-01-01 RX ADMIN — INSULIN LISPRO 1 UNITS: 100 INJECTION, SOLUTION INTRAVENOUS; SUBCUTANEOUS at 08:16

## 2019-01-01 RX ADMIN — DILTIAZEM HYDROCHLORIDE 180 MG: 180 CAPSULE, COATED, EXTENDED RELEASE ORAL at 08:46

## 2019-01-01 RX ADMIN — LACTOBACILLUS TAB 2 TABLET: TAB at 09:03

## 2019-01-01 RX ADMIN — DILTIAZEM HYDROCHLORIDE 180 MG: 180 CAPSULE, COATED, EXTENDED RELEASE ORAL at 08:43

## 2019-01-01 RX ADMIN — INSULIN GLARGINE 45 UNITS: 100 INJECTION, SOLUTION SUBCUTANEOUS at 08:49

## 2019-01-01 RX ADMIN — INSULIN LISPRO 6 UNITS: 100 INJECTION, SOLUTION INTRAVENOUS; SUBCUTANEOUS at 12:37

## 2019-01-01 RX ADMIN — ALBUTEROL SULFATE 2.5 MG: 2.5 SOLUTION RESPIRATORY (INHALATION) at 20:06

## 2019-01-01 RX ADMIN — CAPTOPRIL 50 MG: 25 TABLET ORAL at 17:30

## 2019-01-01 RX ADMIN — CEFTRIAXONE 2 G: 2 INJECTION, POWDER, FOR SOLUTION INTRAMUSCULAR; INTRAVENOUS at 14:48

## 2019-01-01 RX ADMIN — INSULIN LISPRO 2 UNITS: 100 INJECTION, SOLUTION INTRAVENOUS; SUBCUTANEOUS at 12:12

## 2019-01-01 RX ADMIN — CAPTOPRIL 50 MG: 25 TABLET ORAL at 08:40

## 2019-01-01 RX ADMIN — GUAIFENESIN 600 MG: 600 TABLET ORAL at 22:26

## 2019-01-01 RX ADMIN — ALBUTEROL SULFATE 2.5 MG: 2.5 SOLUTION RESPIRATORY (INHALATION) at 13:53

## 2019-01-01 RX ADMIN — DONEPEZIL HYDROCHLORIDE 10 MG: 10 TABLET, FILM COATED ORAL at 22:24

## 2019-01-01 RX ADMIN — FERROUS SULFATE TAB 325 MG (65 MG ELEMENTAL FE) 325 MG: 325 (65 FE) TAB at 08:16

## 2019-01-01 RX ADMIN — PREDNISONE 10 MG: 10 TABLET ORAL at 08:43

## 2019-01-01 RX ADMIN — ALBUTEROL SULFATE 2.5 MG: 2.5 SOLUTION RESPIRATORY (INHALATION) at 07:49

## 2019-01-01 RX ADMIN — Medication 10 ML: at 05:41

## 2019-01-01 RX ADMIN — MYCOPHENOLIC ACID 180 MG: 180 TABLET, DELAYED RELEASE ORAL at 22:25

## 2019-01-01 RX ADMIN — CYCLOSPORINE 25 MG: 25 CAPSULE, LIQUID FILLED ORAL at 08:40

## 2019-01-01 RX ADMIN — LACTOBACILLUS TAB 2 TABLET: TAB at 08:37

## 2019-01-01 RX ADMIN — ASPIRIN 81 MG: 81 TABLET, COATED ORAL at 08:38

## 2019-01-01 RX ADMIN — BUDESONIDE 500 MCG: 0.5 INHALANT RESPIRATORY (INHALATION) at 19:40

## 2019-01-01 RX ADMIN — CEPHALEXIN 500 MG: 500 CAPSULE ORAL at 11:46

## 2019-01-01 RX ADMIN — INSULIN GLARGINE 45 UNITS: 100 INJECTION, SOLUTION SUBCUTANEOUS at 17:28

## 2019-01-01 RX ADMIN — GUAIFENESIN 600 MG: 600 TABLET ORAL at 16:57

## 2019-01-01 RX ADMIN — SOTALOL HYDROCHLORIDE 120 MG: 80 TABLET ORAL at 08:46

## 2019-01-01 RX ADMIN — LACTOBACILLUS TAB 2 TABLET: TAB at 08:39

## 2019-01-01 RX ADMIN — Medication 10 ML: at 05:13

## 2019-01-01 RX ADMIN — INSULIN LISPRO 1 UNITS: 100 INJECTION, SOLUTION INTRAVENOUS; SUBCUTANEOUS at 07:30

## 2019-01-01 RX ADMIN — FUROSEMIDE 60 MG: 10 INJECTION, SOLUTION INTRAMUSCULAR; INTRAVENOUS at 17:19

## 2019-01-01 RX ADMIN — INSULIN LISPRO 12 UNITS: 100 INJECTION, SOLUTION INTRAVENOUS; SUBCUTANEOUS at 07:30

## 2019-01-01 RX ADMIN — CYCLOSPORINE 25 MG: 25 CAPSULE, LIQUID FILLED ORAL at 08:48

## 2019-01-01 RX ADMIN — GUAIFENESIN 600 MG: 600 TABLET ORAL at 08:16

## 2019-01-01 RX ADMIN — CYCLOSPORINE 100 MG: 100 CAPSULE, LIQUID FILLED ORAL at 08:40

## 2019-01-01 RX ADMIN — CYCLOSPORINE 100 MG: 100 CAPSULE, LIQUID FILLED ORAL at 17:23

## 2019-01-01 RX ADMIN — MYCOPHENOLIC ACID 180 MG: 180 TABLET, DELAYED RELEASE ORAL at 08:45

## 2019-01-01 RX ADMIN — DILTIAZEM HYDROCHLORIDE 180 MG: 180 CAPSULE, COATED, EXTENDED RELEASE ORAL at 08:18

## 2019-01-01 RX ADMIN — CEPHALEXIN 500 MG: 500 CAPSULE ORAL at 17:28

## 2019-01-01 RX ADMIN — DORZOLAMIDE HYDROCHLORIDE AND TIMOLOL MALEATE 1 DROP: 20; 5 SOLUTION/ DROPS OPHTHALMIC at 08:19

## 2019-01-01 RX ADMIN — CYCLOSPORINE 100 MG: 100 CAPSULE, LIQUID FILLED ORAL at 08:18

## 2019-01-01 RX ADMIN — SOTALOL HYDROCHLORIDE 120 MG: 80 TABLET ORAL at 21:11

## 2019-01-01 RX ADMIN — ATORVASTATIN CALCIUM 40 MG: 40 TABLET, FILM COATED ORAL at 21:12

## 2019-01-01 RX ADMIN — CYCLOSPORINE 25 MG: 25 CAPSULE, LIQUID FILLED ORAL at 08:20

## 2019-01-01 RX ADMIN — DORZOLAMIDE HYDROCHLORIDE AND TIMOLOL MALEATE 1 DROP: 20; 5 SOLUTION/ DROPS OPHTHALMIC at 17:30

## 2019-01-01 RX ADMIN — INSULIN LISPRO 2 UNITS: 100 INJECTION, SOLUTION INTRAVENOUS; SUBCUTANEOUS at 08:40

## 2019-01-01 RX ADMIN — GUAIFENESIN 600 MG: 600 TABLET ORAL at 08:39

## 2019-01-01 RX ADMIN — CEFTRIAXONE 1 G: 1 INJECTION, POWDER, FOR SOLUTION INTRAMUSCULAR; INTRAVENOUS at 13:44

## 2019-01-01 RX ADMIN — CYCLOSPORINE 100 MG: 100 CAPSULE, LIQUID FILLED ORAL at 22:24

## 2019-01-01 RX ADMIN — INSULIN LISPRO 28 UNITS: 100 INJECTION, SOLUTION INTRAVENOUS; SUBCUTANEOUS at 17:28

## 2019-01-01 RX ADMIN — INFLUENZA VIRUS VACCINE 0.5 ML: 15; 15; 15; 15 SUSPENSION INTRAMUSCULAR at 10:42

## 2019-01-01 RX ADMIN — FUROSEMIDE 60 MG: 10 INJECTION, SOLUTION INTRAMUSCULAR; INTRAVENOUS at 08:38

## 2019-01-01 RX ADMIN — GUAIFENESIN 600 MG: 600 TABLET ORAL at 21:11

## 2019-01-01 RX ADMIN — GUAIFENESIN 600 MG: 600 TABLET ORAL at 16:37

## 2019-01-01 RX ADMIN — INSULIN LISPRO 22 UNITS: 100 INJECTION, SOLUTION INTRAVENOUS; SUBCUTANEOUS at 12:12

## 2019-01-01 RX ADMIN — DORZOLAMIDE HYDROCHLORIDE AND TIMOLOL MALEATE 1 DROP: 20; 5 SOLUTION/ DROPS OPHTHALMIC at 22:30

## 2019-01-01 RX ADMIN — HYDRALAZINE HYDROCHLORIDE 50 MG: 25 TABLET, FILM COATED ORAL at 08:18

## 2019-01-01 RX ADMIN — MYCOPHENOLIC ACID 180 MG: 180 TABLET, DELAYED RELEASE ORAL at 08:42

## 2019-01-01 RX ADMIN — INSULIN LISPRO 2 UNITS: 100 INJECTION, SOLUTION INTRAVENOUS; SUBCUTANEOUS at 11:52

## 2019-01-01 RX ADMIN — CEPHALEXIN 500 MG: 500 CAPSULE ORAL at 23:09

## 2019-01-01 RX ADMIN — ALBUTEROL SULFATE 2.5 MG: 2.5 SOLUTION RESPIRATORY (INHALATION) at 15:24

## 2019-01-01 RX ADMIN — CEPHALEXIN 500 MG: 500 CAPSULE ORAL at 12:12

## 2019-01-01 RX ADMIN — PREDNISONE 10 MG: 10 TABLET ORAL at 08:42

## 2019-01-01 RX ADMIN — SOTALOL HYDROCHLORIDE 120 MG: 80 TABLET ORAL at 08:19

## 2019-01-01 RX ADMIN — TUBERCULIN PURIFIED PROTEIN DERIVATIVE 5 UNITS: 5 INJECTION, SOLUTION INTRADERMAL at 14:45

## 2019-01-01 RX ADMIN — LACTOBACILLUS TAB 2 TABLET: TAB at 17:29

## 2019-01-01 RX ADMIN — ESOMEPRAZOLE MAGNESIUM 40 MG: 40 CAPSULE, DELAYED RELEASE ORAL at 06:01

## 2019-01-01 RX ADMIN — HYDRALAZINE HYDROCHLORIDE 50 MG: 25 TABLET, FILM COATED ORAL at 08:41

## 2019-01-01 RX ADMIN — INSULIN LISPRO 1 UNITS: 100 INJECTION, SOLUTION INTRAVENOUS; SUBCUTANEOUS at 22:36

## 2019-01-01 RX ADMIN — Medication 10 ML: at 16:59

## 2019-01-01 RX ADMIN — DORZOLAMIDE HYDROCHLORIDE AND TIMOLOL MALEATE 1 DROP: 20; 5 SOLUTION/ DROPS OPHTHALMIC at 08:42

## 2019-01-01 RX ADMIN — Medication 10 ML: at 14:40

## 2019-01-01 RX ADMIN — GUAIFENESIN 600 MG: 600 TABLET ORAL at 08:37

## 2019-01-01 RX ADMIN — FUROSEMIDE 80 MG: 40 TABLET ORAL at 08:45

## 2019-01-01 RX ADMIN — INSULIN GLARGINE 45 UNITS: 100 INJECTION, SOLUTION SUBCUTANEOUS at 17:19

## 2019-01-01 RX ADMIN — Medication 10 ML: at 21:05

## 2019-01-01 RX ADMIN — FUROSEMIDE 80 MG: 40 TABLET ORAL at 17:28

## 2019-01-01 RX ADMIN — FERROUS SULFATE TAB 325 MG (65 MG ELEMENTAL FE) 325 MG: 325 (65 FE) TAB at 08:39

## 2019-01-01 RX ADMIN — CEPHALEXIN 500 MG: 500 CAPSULE ORAL at 06:01

## 2019-01-01 RX ADMIN — BUDESONIDE 500 MCG: 0.5 INHALANT RESPIRATORY (INHALATION) at 07:49

## 2019-01-01 RX ADMIN — CAPTOPRIL 50 MG: 25 TABLET ORAL at 08:46

## 2019-01-01 RX ADMIN — ALBUTEROL SULFATE 2.5 MG: 2.5 SOLUTION RESPIRATORY (INHALATION) at 01:34

## 2019-01-01 RX ADMIN — CAPTOPRIL 50 MG: 25 TABLET ORAL at 22:25

## 2019-01-01 RX ADMIN — SOTALOL HYDROCHLORIDE 120 MG: 80 TABLET ORAL at 21:04

## 2019-01-01 RX ADMIN — FUROSEMIDE 60 MG: 10 INJECTION, SOLUTION INTRAMUSCULAR; INTRAVENOUS at 08:16

## 2019-01-01 RX ADMIN — CAPTOPRIL 50 MG: 25 TABLET ORAL at 21:12

## 2019-01-01 RX ADMIN — HYDRALAZINE HYDROCHLORIDE 50 MG: 25 TABLET, FILM COATED ORAL at 22:25

## 2019-01-01 RX ADMIN — LACTOBACILLUS TAB 2 TABLET: TAB at 17:19

## 2019-01-01 RX ADMIN — HYDRALAZINE HYDROCHLORIDE 50 MG: 25 TABLET, FILM COATED ORAL at 21:04

## 2019-01-01 RX ADMIN — DORZOLAMIDE HYDROCHLORIDE AND TIMOLOL MALEATE 1 DROP: 20; 5 SOLUTION/ DROPS OPHTHALMIC at 08:48

## 2019-01-01 RX ADMIN — BUDESONIDE 500 MCG: 0.5 INHALANT RESPIRATORY (INHALATION) at 19:32

## 2019-01-01 RX ADMIN — GUAIFENESIN 600 MG: 600 TABLET ORAL at 21:04

## 2019-01-01 RX ADMIN — MYCOPHENOLIC ACID 180 MG: 180 TABLET, DELAYED RELEASE ORAL at 17:31

## 2019-01-01 RX ADMIN — INSULIN LISPRO 22 UNITS: 100 INJECTION, SOLUTION INTRAVENOUS; SUBCUTANEOUS at 11:42

## 2019-01-01 RX ADMIN — INSULIN LISPRO 3 UNITS: 100 INJECTION, SOLUTION INTRAVENOUS; SUBCUTANEOUS at 11:42

## 2019-01-01 RX ADMIN — DONEPEZIL HYDROCHLORIDE 10 MG: 10 TABLET, FILM COATED ORAL at 21:03

## 2019-01-01 RX ADMIN — CYCLOSPORINE 100 MG: 100 CAPSULE, LIQUID FILLED ORAL at 17:29

## 2019-01-01 RX ADMIN — PREDNISONE 10 MG: 10 TABLET ORAL at 08:18

## 2019-01-01 RX ADMIN — Medication 10 ML: at 22:48

## 2019-01-01 RX ADMIN — CEFTRIAXONE 1 G: 1 INJECTION, POWDER, FOR SOLUTION INTRAMUSCULAR; INTRAVENOUS at 12:34

## 2019-04-05 PROBLEM — A49.1 INFECTION DUE TO STREPTOCOCCUS PNEUMONIAE: Status: ACTIVE | Noted: 2019-01-01

## 2019-04-05 PROBLEM — L97.519 DIABETIC ULCER OF TOE OF RIGHT FOOT ASSOCIATED WITH TYPE 2 DIABETES MELLITUS (HCC): Status: ACTIVE | Noted: 2019-01-01

## 2019-04-05 PROBLEM — E11.621 DIABETIC ULCER OF TOE OF RIGHT FOOT ASSOCIATED WITH TYPE 2 DIABETES MELLITUS (HCC): Status: ACTIVE | Noted: 2019-01-01

## 2019-04-16 NOTE — THERAPY EVALUATION
Loi Tian  : 1940  [place]  Secondary: Elia Smith 75 at [address] [address], [address], [place] [address].  [phone]   [phone]         OUTPATIENT SPEECH LANGUAGE PATHOLOGY: Discontinuation Summary  ICD-10: Treatment Diagnosis: Oropharyngeal Dysphagia R13.12  REFERRING PHYSICIAN: Michael Lewis MD MD Orders: Evaluate and Treat  PAST MEDICAL HISTORY:   Mr. Vivi Camarillo is a 69 y.o. male who  has a past medical history of Abnormal cardiovascular function study, Abnormal EKG, Allergic rhinitis (2015), Anemia, ARF (acute renal failure) ([address] (3/23/2010), Atrial fibrillation ([address] (2015), Atrial fibrillation with rapid ventricular response ([address] (2014), CAD (Coronary Artery Disease), Native Coronary Artery, CABG x4 (2009), Cancer ([address] Chest pain, Chronic constipation, Chronic diastolic heart failure ([address] Chronic kidney disease, Cough, Diabetes mellitus with background retinopathy ([address] Diabetes Mellitus, insulin dependent (2009), Dyspnea on exertion, Erectile dysfunction/impotence, Gastroenteritis, acute (3/23/2010), Gastroesophageal reflux (2015), Glaucoma (2009), HTN (hypertension) (2009), Hyperlipidemia (2015), ILD (interstitial lung disease) ([address] (2015), Long-term current use of steroids (3/18/2015), Morbid obesity with body mass index of 45.0-49.9 in [facility]) (2014), Need for prophylactic antibiotic (3/18/2015), ALDEN (Obstructive Sleep Apnea), uses home CPAP (2009), Paroxysmal SVT/rapid atrial  (supraventricular tachycardia) ([address] Personal history of tobacco use (3/18/2015), Pneumonia, Pneumonia, organism unspecified(486) (2009), Premature ventricular contraction, Reflux esophagitis - (GERD), Renal cancer ([address] (2010), Restrictive lung disease (3/18/2015), Retinopathy (2015), S/P CABG (coronary artery bypass graft), Status post kidney transplant (12/29/2009), Transplant, kidney (6/12/96), Transplanted kidney, and URI (upper respiratory infection). He also has no past medical history of Stroke [facility]). He also  has a past surgical history that includes hx cataract removal (1995, 2005); hx retinal detachment repair (1988, 1989); hx vascular access (1995); pr cabg, artery-vein, four (2002); pr transplant kidney+recip nephrec (6/12/1996); hx urological; and hx amputation (2005). MEDICAL/REFERRING DIAGNOSIS: Cough [R05]  DATE OF ONSET: 3-4 months   PRIOR LEVEL OF FUNCTION: requires assistance  PRECAUTIONS/ALLERGIES: Patient has no known allergies. ASSESSMENT:  Patient completed MBSS however did not f/u with ST for laryngeal HEP. This will serve as his discontinuation summary        ?????? ? ? This section established at most recent assessment??????????  PROBLEM LIST (Impairments causing functional limitations):  1. Dysphagia  GOALS: (Goals have been discussed and agreed upon with patient.)  SHORT-TERM FUNCTIONAL GOALS: Time Frame: 3 months  Patient will complete MBSS at 100% participation. Not met  Patient will complete laryngeal exercises with Min A with 85% accuracy Not Met  DISCHARGE GOALS: Time Frame: 12 weeks  1. Patient will tolerate least restrictive diet without signs/symptoms of aspiration at discharge for safe swallow function. ' Not met  REHABILITATION POTENTIAL FOR STATED GOALS: Fair     SUBJECTIVE:  Alert  Present Symptoms: Dysphagia    Pain Intensity 1: 0  Current Medications:   Current Outpatient Medications on File Prior to Encounter   Medication Sig Dispense Refill    donepezil (ARICEPT) 10 mg tablet Take 1 Tab by mouth nightly. 30 Tab 6    aspirin delayed-release 81 mg tablet Take 81 mg by mouth daily.  ALPHAGAN P 0.1 % ophthalmic solution Administer 1 Drop to right eye two (2) times a day.       dorzolamide-timolol (COSOPT) 22.3-6.8 mg/mL ophthalmic solution Administer 1 Drop to both eyes two (2) times a day.      glucose blood VI test strips (ONETOUCH ULTRA TEST) strip To check blood sugar 4 x per day. Dx E11.42      azelastine (ASTELIN) 137 mcg (0.1 %) nasal spray 1 Spangler by Both Nostrils route two (2) times a day. Use in each nostril as directed (Patient taking differently: 1 Spray by Both Nostrils route two (2) times daily as needed. Use in each nostril as directed) 1 Bottle 11    apixaban (ELIQUIS) 5 mg tablet Take 1 Tab by mouth two (2) times a day. 60 Tab 11    insulin lispro (HUMALOG) 100 unit/mL injection 14/22/30 and sliding scale      NEXIUM 40 mg capsule TAKE 1 CAPSULE (40MG) BY ORAL ROUTE EVERY AM BEFORE A MEAL (INS WILL COVER 4/10/17)  6    furosemide (LASIX) 80 mg tablet Take 1 Tab by mouth daily. Alternating with 2 (Patient taking differently: Take 80 mg by mouth daily. Alter with 2  Indications: Accumulation of Fluid Resulting from Chronic Heart Failure) 45 Tab 11    cycloSPORINE modified (NEORAL) 100 mg capsule Take 1 Cap by mouth two (2) times a day. HOLD TODAY'S (8/27/16) DOSES (Patient taking differently: Take 100 mg by mouth two (2) times a day.) 1 Cap 0    ferrous sulfate 325 mg (65 mg iron) tablet Take 65 mg by mouth daily. Indications: three times weekly MWF      multivitamin (ONE A DAY) tablet Strength: Multiple Vitamins; Form: tablet; SIG: take 1 tab(s) orally once a day      polyethylene glycol (MIRALAX) 17 gram packet Take 17 g by mouth as needed.  cpap machine kit by Does Not Apply route. 17 cm qhs      acetaminophen (TYLENOL EXTRA STRENGTH) 500 mg tablet Take 1,000 mg by mouth every six (6) hours as needed for Pain.  loratadine (CLARITIN) 10 mg tablet Take 10 mg by mouth daily as needed.  mycophenolate sodium (MYFORTIC) 180 mg EC tablet Take 180 mg by mouth two (2) times a day.  CALCIUM CARBONATE/VITAMIN D3 (CALCIUM 600 + D PO) Take 1 Tab by mouth daily.  captopril (CAPOTEN) 50 mg tablet Take 50 mg by mouth three (3) times daily.       predniSONE (DELTASONE) 10 mg tablet Take 10 mg by mouth daily (with breakfast).  insulin glargine (LANTUS) 100 unit/mL injection 50 Units by SubCUTAneous route two (2) times a day. No current facility-administered medications on file prior to encounter. Date Last Reviewed: 10/29/18  Current Dietary Status:  Regular      History of reflux:  YES    Reflux medication:Nexium  Social History/Home Situation:       Work/Activity History: Disabled    OBJECTIVE:  Objective Measure: Tool Used: National Outcomes Measurement System: Functional Communication Measures: SWALLOWING  Score:  Initial: 5 Most Recent: X (Date: -- )   Interpretation of Tool: This measure describes the change in functional communication status subsequent to speech-language pathology treatment of patients with dysphagia.  o Level 1:  Individual is not able to swallow anything safely by mouth. All nutrition and hydration is received through non-oral means (e.g., nasogastric tube, PEG). o Level 2: Individual is not able to swallow safely by mouth for nutrition and hydration, but may take some consistency with consistent maximal cues in therapy only. Alternative method of feeding required. o Level 3:  Alternative method of feeding required as individual takes less than 50% of nutrition and hydration by mouth, and/or swallowing is safe with consistent use of moderate cues to use compensatory strategies and/or requires maximum diet restriction. o Level 4:  Swallowing is safe, but usually requires moderate cues to use compensatory strategies, and/or the individual has moderate diet restrictions and/or still requires tube feeding and/or oral supplements. o Level 5:  Swallowing is safe with minimal diet restriction and/or occasionally requires minimal cueing to use compensatory strategies. The individual may occasionally self-cue. All nutrition and hydration needs are met by mouth at mealtime.   o Level 6:  Swallowing is safe, and the individual eats and drinks independently and may rarely require minimal cueing. The individual usually self-cues when difficulty occurs. May need to avoid specific food items (e.g., popcorn and nuts), or require additional time (due to dysphagia). o Level 7: The individuals ability to eat independently is not limited by swallow function. Swallowing would be safe and efficient for all consistencies. Compensatory strategies are effectively used when needed. Score Level 7 Level 6 Level 5 Level 4 Level 3 Level 2 Level 1   Modifier CH CI CJ CK CL CM CN   ? Swallowing:     - CURRENT STATUS: CJ - 20%-39% impaired, limited or restricted    - GOAL STATUS:  CH - 0% impaired, limited or restricted    - D/C STATUS:  unable to assess    Respiratory Status:      Room Air  CXR Results:Clear  MRI/CT Results:N/A  Oral Motor Structure/Speech:  Oral-Motor Structure/Motor Speech  Labial: No impairment  Dentition: Upper dentures, Natural  Oral Hygiene: fair  Lingual: Decreased rate  Velum: Palate (comment)(decreased)  Mandible: No impairment    Cognitive and Communication Status:                      BEDSIDE SWALLOW EVALUATION  Oral Assessment:     P.O. Trials: The patient was given teaspoon to tablespoon   amounts of the following:           ORAL PHASE:                   PHARYNGEAL PHASE:                            OTHER OBSERVATIONS:  Rate/bite size: WNL   Endurance:  Questionable   Coments:      TREATMENT:    (In addition to Assessment/Re-Assessment sessions the following treatments were rendered)  Assessment/Reassessment only, no treatment provided today        LARYNGEAL / PHARYNGEAL EXERCISES:                                                                                                                                     __________________________________________________________________________________________________    HALIMA Raymundo

## 2019-07-05 PROBLEM — K56.2 VOLVULUS (HCC): Status: RESOLVED | Noted: 2018-01-01 | Resolved: 2019-01-01

## 2019-09-15 PROBLEM — N39.0 UTI (URINARY TRACT INFECTION): Status: ACTIVE | Noted: 2019-01-01

## 2019-09-15 PROBLEM — J96.01 ACUTE RESPIRATORY FAILURE WITH HYPOXIA (HCC): Status: ACTIVE | Noted: 2019-01-01

## 2019-09-15 NOTE — ED TRIAGE NOTES
Patient arrives via EMS from home. States called d/t possible UTI. Wife states 0200 patient began to have frequent urination with slight blood tinge and ailyn blood in urine around 0600 along with temp of 99, per wife. Patient a kidney transplant patient and on medications. Patient also with hx of mild dementia. Patient uses CPAP at night, currently on 2L. VSS in triage.

## 2019-09-15 NOTE — PROGRESS NOTES
TRANSFER - IN REPORT:    Verbal report received from Summer (name) on Keo Patterson  being received from ER(unit) for routine progression of care      Report consisted of patients Situation, Background, Assessment and   Recommendations(SBAR). Information from the following report(s) SBAR, Kardex and MAR was reviewed with the receiving nurse. Opportunity for questions and clarification was provided. Assessment will be completed upon patients arrival to unit and care assumed.

## 2019-09-15 NOTE — PROGRESS NOTES
Pt's wife insisted she will bring all of pt's meds from home to give pt here in the hospital including Eliquis.

## 2019-09-15 NOTE — ED NOTES
TRANSFER - OUT REPORT:    Verbal report given to [facility], RN (name) on Keo Patterson  being transferred to [phone] (unit) for routine progression of care       Report consisted of patients Situation, Background, Assessment and   Recommendations(SBAR). Information from the following report(s) SBAR, ED Summary, Intake/Output, MAR and Recent Results was reviewed with the receiving nurse. Lines:       Opportunity for questions and clarification was provided.       Patient transported with:   "[employer]

## 2019-09-15 NOTE — PROGRESS NOTES
Pt arrived floor via stretcher. Pt is alert and oriented x 4. Family at bedside. Pt oriented to room and call light. Dual skin assessment performed with PHYSICIAN'S [facility] RN. 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 Pt has a big red shiny blister on his anterior right sheen. Old scar noted on right inner thigh and to the knee. Pt denies pain at this time. Will continue to monitor.

## 2019-09-15 NOTE — ED PROVIDER NOTES
78-year-old black male with history of kidney transplant presents the emergency department complaining of increased urinary frequency with dysuria starting approximately 2 AM this morning. According the wife, patient was running a low-grade fever as well. Patient was seen earlier in the week/6 days ago for cough, put on a cough suppressant but no antibiotics. Cough is only productive of whitish sputum but today had a little blood in it. Patient does take Eliquis. The history is provided by the patient, the spouse, a relative and the EMS personnel. Blood in Urine    This is a new problem. The current episode started 3 to 5 hours ago. The problem occurs every urination. The problem has not changed since onset. The patient is experiencing no pain. Patient reports a subjective (States they put the thermometer in but he refused to keep it in and it only went to 99 but never been) fever - was not measured. The fever has been present for less than 1 day. He is not sexually active. There is no history of pyelonephritis. Associated symptoms include frequency and hematuria. Pertinent negatives include no chills, no sweats, no nausea, no vomiting, no discharge, no hesitancy, no urgency, no flank pain, no abdominal pain and no back pain. He has tried nothing for the symptoms. The treatment provided no relief. His past medical history is significant for single kidney. His past medical history does not include kidney stones, urological procedure, recurrent UTIs, urinary stasis, catheterization or urinary catheter problem.         Past Medical History:   Diagnosis Date    Abnormal cardiovascular function study     Abnormal EKG     Allergic rhinitis 8/27/2015    Anemia     ARF (acute renal failure) ([facility]) 3/23/2010    Atrial fibrillation ([facility] 12/9/2015    Atrial fibrillation with rapid ventricular response ([facility] 1/23/2014    CAD (Coronary Artery Disease), Native Coronary Artery, CABG x4 12/29/2009    Cancer ([facility] L nephrectomy due to CA    Chest pain     Chronic constipation     Chronic diastolic heart failure (HCC)     Chronic kidney disease     Cough     Diabetes mellitus with background retinopathy ([facility] [facility]     Diabetes Mellitus, insulin dependent 12/29/2009    Dyspnea on exertion     Erectile dysfunction/impotence     Gastroenteritis, acute 3/23/2010    Gastroesophageal reflux 12/9/2015    Glaucoma 12/29/2009    HTN (hypertension) 12/29/2009    Hyperlipidemia 12/9/2015    ILD (interstitial lung disease) ([facility] [facility] 8/27/2015    Long-term current use of steroids 3/18/2015    Due to renal transplant     Morbid obesity with body mass index of 45.0-49.9 in adult [facility]) 1/21/2014    Need for prophylactic antibiotic 3/18/2015    S/P kidney transplant     ALDEN (Obstructive Sleep Apnea), uses home CPAP 12/29/2009    Paroxysmal SVT/rapid atrial  (supraventricular tachycardia) ([facility] [address] [facility]     Personal history of tobacco use 3/18/2015    Pneumonia     3/2010    Pneumonia, organism unspecified(486) 12/30/2009    Premature ventricular contraction     Reflux esophagitis - (GERD)     Renal cancer ([facility] [address] 9/13/2010    Restrictive lung disease 3/18/2015    Retinopathy 12/9/2015    S/P CABG (coronary artery bypass graft)     Status post kidney transplant 12/29/2009    Transplant, kidney 6/12/96    to R; native kidney intact    Transplanted kidney     URI (upper respiratory infection)        Past Surgical History:   Procedure Laterality Date    CABG, ARTERY-VEIN, FOUR  2002    at [facility]    HX AMPUTATION  2005    l ft toe off 1st then L BKA    HX CATARACT REMOVAL  1995, 2005    bilateral    [address]    bilateral    HX UROLOGICAL      L nephrectomy due to CA    HX VASCULAR ACCESS  1995    left arm not functioning    TRANSPLANT KIDNEY+RECIP [facility]  6/12/1996    right         Family History:   Problem Relation Age of Onset    Heart Attack Mother     Diabetes Mother     Heart Disease Mother  Diabetes Sister     Diabetes Brother     Hypertension Brother     Stroke Brother     Glaucoma Father        Social History     Socioeconomic History    Marital status:      Spouse name: Not on file    Number of children: Not on file    Years of education: Not on file    Highest education level: Not on file   Occupational History    Not on file   Social Needs    Financial resource strain: Not on file    Food insecurity:     Worry: Not on file     Inability: Not on file    Transportation needs:     Medical: Not on file     Non-medical: Not on file   Tobacco Use    Smoking status: Former Smoker     Packs/day: 1.00     Years: 30.00     Pack years: 30.00     Types: Cigarettes, Cigars     Last attempt to quit: 2009     Years since quittin.7    Smokeless tobacco: Never Used    Tobacco comment:    Substance and Sexual Activity    Alcohol use: No    Drug use: Yes     Types: Prescription    Sexual activity: Not on file   Lifestyle    Physical activity:     Days per week: Not on file     Minutes per session: Not on file    Stress: Not on file   Relationships    Social connections:     Talks on phone: Not on file     Gets together: Not on file     Attends Holiness service: Not on file     Active member of club or organization: Not on file     Attends meetings of clubs or organizations: Not on file     Relationship status: Not on file    Intimate partner violence:     Fear of current or ex partner: Not on file     Emotionally abused: Not on file     Physically abused: Not on file     Forced sexual activity: Not on file   Other Topics Concern    Not on file   Social History Narrative     and lives with wife. 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 He has always lived in [place]. ALLERGIES: Chlorhexidine    Review of Systems   Constitutional: Positive for fatigue. Negative for chills and fever.    Respiratory: Positive for cough and shortness of breath (Worsening dyspnea on exertion over the last week. ). Negative for wheezing. Gastrointestinal: Negative for abdominal pain, constipation, diarrhea, nausea and vomiting. Genitourinary: Positive for frequency and hematuria. Negative for flank pain, hesitancy and urgency. Musculoskeletal: Negative for back pain. All other systems reviewed and are negative. Vitals:    09/15/19 1008   BP: 144/72   Pulse: 78   Resp: 18   Temp: 98.7 °F (37.1 °C)   SpO2: 93%   Weight: 154.2 kg (340 lb)   Height: 6' 2\" (1.88 m)            Physical Exam   Constitutional: He appears well-developed and well-nourished. No distress. Morbidly obese black male with the left BKA   HENT:   Head: Normocephalic and atraumatic. Right Ear: External ear normal.   Left Ear: External ear normal.   Mouth/Throat: Oropharynx is clear and moist.   Eyes: Pupils are equal, round, and reactive to light. Conjunctivae and EOM are normal.   Neck: Normal range of motion. Neck supple. Cardiovascular: Normal rate, regular rhythm, normal heart sounds and intact distal pulses. Exam reveals no gallop and no friction rub. No murmur heard. Pulmonary/Chest: Effort normal. No respiratory distress. He has decreased breath sounds in the right lower field and the left lower field. He has no wheezes. He has no rhonchi. He has no rales. Abdominal: Soft. Bowel sounds are normal. There is no tenderness. Musculoskeletal: Normal range of motion. He exhibits edema (3-4+ right lower extremity with chronic venous stasis changes. Patient does have some dry gangrene to the right great toe.). Left BKA   Neurological: He is alert. He has normal strength. He is disoriented (to Time). No cranial nerve deficit or sensory deficit. Skin: Skin is warm and dry. Capillary refill takes less than 2 seconds. Psychiatric: He has a normal mood and affect. His speech is normal. He is slowed. Cognition and memory are impaired. Nursing note and vitals reviewed. MDM  Number of Diagnoses or Management Options  Acute on chronic congestive heart failure, unspecified heart failure type [facility]): new and requires workup  Acute renal failure superimposed on chronic kidney disease, unspecified CKD stage, unspecified acute renal failure type [facility]): new and requires workup  Hypoxia: new and requires workup  Urinary tract infection with hematuria, site unspecified: new and requires workup     Amount and/or Complexity of Data Reviewed  Clinical lab tests: ordered and reviewed  Tests in the radiology section of CPT®: ordered and reviewed  Obtain history from someone other than the patient: yes  Review and summarize past medical records: yes  Discuss the patient with other providers: yes  Independent visualization of images, tracings, or specimens: yes    Risk of Complications, Morbidity, and/or Mortality  Presenting problems: high  Diagnostic procedures: moderate  Management options: high    Patient Progress  Patient progress: stable         Procedures    The patient was observed in the ED. patient went down to an O2 sat 80% on room air, and chest x-ray is consistent with some increased vascular congestion. The patient had taken his Lasix right after arrival 80 mg p.o. urinalysis was positive for evidence of infection as well as hematuria and the patient was placed on Rocephin 1 g IV. Urine was sent for culture the case was discussed with the hospitalist who will admit.     Results Reviewed:      Recent Results (from the past 24 hour(s))   CBC WITH AUTOMATED DIFF    Collection Time: 09/15/19 10:16 AM   Result Value Ref Range    WBC 11.3 (H) 4.3 - 11.1 K/uL    RBC 4.03 (L) 4.23 - 5.6 M/uL    HGB 11.3 (L) 13.6 - 17.2 g/dL    HCT 38.0 (L) 41.1 - 50.3 %    MCV 94.3 79.6 - 97.8 FL    MCH 28.0 26.1 - 32.9 PG    MCHC 29.7 (L) 31.4 - 35.0 g/dL    RDW 18.9 (H) 11.9 - 14.6 %    PLATELET 953 926 - 552 K/uL    MPV 10.5 9.4 - 12.3 FL    ABSOLUTE NRBC 0.00 0.0 - 0.2 K/uL    DF AUTOMATED NEUTROPHILS 75 43 - 78 %    LYMPHOCYTES 14 13 - 44 %    MONOCYTES 10 4.0 - 12.0 %    EOSINOPHILS 0 (L) 0.5 - 7.8 %    BASOPHILS 0 0.0 - 2.0 %    IMMATURE GRANULOCYTES 0 0.0 - 5.0 %    ABS. NEUTROPHILS 8.5 (H) 1.7 - 8.2 K/UL    ABS. LYMPHOCYTES 1.6 0.5 - 4.6 K/UL    ABS. MONOCYTES 1.1 0.1 - 1.3 K/UL    ABS. EOSINOPHILS 0.0 0.0 - 0.8 K/UL    ABS. BASOPHILS 0.0 0.0 - 0.2 K/UL    ABS. IMM. GRANS. 0.0 0.0 - 0.5 K/UL   METABOLIC PANEL, COMPREHENSIVE    Collection Time: 09/15/19 10:16 AM   Result Value Ref Range    Sodium 148 (H) 136 - 145 mmol/L    Potassium 3.9 3.5 - 5.1 mmol/L    Chloride 110 (H) 98 - 107 mmol/L    CO2 30 21 - 32 mmol/L    Anion gap 8 7 - 16 mmol/L    Glucose 93 65 - 100 mg/dL    BUN 28 (H) 8 - 23 MG/DL    Creatinine 1.52 (H) 0.8 - 1.5 MG/DL    GFR est AA 57 (L) >60 ml/min/1.73m2    GFR est non-AA 47 (L) >60 ml/min/1.73m2    Calcium 9.7 8.3 - 10.4 MG/DL    Bilirubin, total 0.4 0.2 - 1.1 MG/DL    ALT (SGPT) 16 12 - 65 U/L    AST (SGOT) 18 15 - 37 U/L    Alk.  phosphatase 67 50 - 136 U/L    Protein, total 7.0 6.3 - 8.2 g/dL    Albumin 3.0 (L) 3.2 - 4.6 g/dL    Globulin 4.0 (H) 2.3 - 3.5 g/dL    A-G Ratio 0.8 (L) 1.2 - 3.5     MAGNESIUM    Collection Time: 09/15/19 10:16 AM   Result Value Ref Range    Magnesium 2.2 1.8 - 2.4 mg/dL   PTT    Collection Time: 09/15/19 10:16 AM   Result Value Ref Range    aPTT 29.0 24.7 - 39.8 SEC   TROPONIN I    Collection Time: 09/15/19 10:16 AM   Result Value Ref Range    Troponin-I, Qt. 0.04 0.02 - 0.05 NG/ML   TSH 3RD GENERATION    Collection Time: 09/15/19 10:16 AM   Result Value Ref Range    TSH 1.160 0.358 - 3.740 uIU/mL   BNP    Collection Time: 09/15/19 11:16 AM   Result Value Ref Range     (H) 0 pg/mL   URINE MICROSCOPIC    Collection Time: 09/15/19 11:21 AM   Result Value Ref Range    WBC >100 (H) 0 /hpf    RBC >100 (H) 0 /hpf    Epithelial cells 0-3 0 /hpf    Bacteria 2+ (H) 0 /hpf    Casts 0-3 0 /lpf   EKG, 12 LEAD, INITIAL    Collection Time: 09/15/19 11:27 AM   Result Value Ref Range    Ventricular Rate 71 BPM    Atrial Rate 277 BPM    QRS Duration 130 ms    Q-T Interval 436 ms    QTC Calculation (Bezet) 473 ms    Calculated R Axis -8 degrees    Calculated T Axis 171 degrees    Diagnosis       !! AGE AND GENDER SPECIFIC ECG ANALYSIS !! Wide QRS rhythm  Left ventricular hypertrophy with QRS widening and repolarization abnormality  Abnormal ECG  When compared with ECG of 24-DEC-2016 12:24,  Wide QRS rhythm has replaced Sinus rhythm     POC LACTIC ACID    Collection Time: 09/15/19 11:34 AM   Result Value Ref Range    Lactic Acid (POC) 1.46 0.5 - 1.9 mmol/L           Dictated using voice recognition software.  Proofread, but unrecognized errors may exist.

## 2019-09-15 NOTE — H&P
History & Physcial   NAME:  Chayito Mitchell   Age:  67 y.o.  :   1940   MRN:   [id number]  PCP: Darlin Bardales MD  Treatment Team: Primary Nurse: Dang Rogers  HPI:   Mr. Malik Teran is a 68 yo male with PMH of afib on eliquis, HTN, ALDEN on CPAP, diastolic CHF last EF 11-60% Aug 2019, ILD followed by [facility] Pul[facility], renal cell carcinoma s/p renal transplant left, dry gangrene currently being tx by outpatient wound care right great toe and second toe who presented with c/o fever, urinary incontinence, dysuria. Also c/o cough which he has been seen for last week. Cough is productive with white sputum with red streaks. UA +UTI. CXR with mild interstitial edema, cardiomegaly, unchanged small left pleural effusion. Pt found to be hypoxic with RA sats 87-88%, improved with 2 L O2 via NC. Pt drowsy, wife states he did not sleep last night. Pt denies CP, SOB, sick contact, abd pain. Results summary of Diagnostic Studies/Procedures copied from within [facility] EMR:     CXR Results  (Last 48 hours)               09/15/19 1119  XR CHEST SNGL V Final result    Impression:  IMPRESSION:       1. Unchanged pronounced cardiomegaly. 2. Mild interstitial edema. 3. Unchanged small left pleural effusion. VOICE DICTATED BY: Dr. Laura Ibarra       Narrative:  EXAMINATION: CHEST RADIOGRAPH 9/15/2019 11:19 AM       ACCESSION NUMBER: [id number]       INDICATION: Shortness of breath, hypoxia       COMPARISON: Chest x-ray 2019       TECHNIQUE: A single AP view of the chest was obtained. FINDINGS:        Support Lines and Tubes: None       Cardiac Silhouette: Unchanged pronounced cardiomegaly. Lungs: Mild interstitial edema. Pleura: Small left pleural effusion. No pneumothorax. Osseous Structures: Prior median sternotomy. Upper Abdomen: Unremarkable.                      Complete ROS done and is as stated in HPI or otherwise negative  Past Medical History: Diagnosis Date    Abnormal cardiovascular function study     Abnormal EKG     Allergic rhinitis 8/27/2015    Anemia     ARF (acute renal failure) (HCC) 3/23/2010    Atrial fibrillation ([address] 12/9/2015    Atrial fibrillation with rapid ventricular response ([address] 1/23/2014    CAD (Coronary Artery Disease), Native Coronary Artery, CABG x4 12/29/2009    Cancer ([address]     L nephrectomy due to CA    Chest pain     Chronic constipation     Chronic diastolic heart failure (HCC)     Chronic kidney disease     Cough     Diabetes mellitus with background retinopathy ([address]     Diabetes Mellitus, insulin dependent 12/29/2009    Dyspnea on exertion     Erectile dysfunction/impotence     Gastroenteritis, acute 3/23/2010    Gastroesophageal reflux 12/9/2015    Glaucoma 12/29/2009    HTN (hypertension) 12/29/2009    Hyperlipidemia 12/9/2015    ILD (interstitial lung disease) ([address] 8/27/2015    Long-term current use of steroids 3/18/2015    Due to renal transplant     Morbid obesity with body mass index of 45.0-49.9 in adult [facility]) 1/21/2014    Need for prophylactic antibiotic 3/18/2015    S/P kidney transplant     ALDEN (Obstructive Sleep Apnea), uses home CPAP 12/29/2009    Paroxysmal SVT/rapid atrial  (supraventricular tachycardia) ([address]     Personal history of tobacco use 3/18/2015    Pneumonia     3/2010    Pneumonia, organism unspecified(486) 12/30/2009    Premature ventricular contraction     Reflux esophagitis - (GERD)     Renal cancer ([address] 9/13/2010    Restrictive lung disease 3/18/2015    Retinopathy 12/9/2015    S/P CABG (coronary artery bypass graft)     Status post kidney transplant 12/29/2009    Transplant, kidney 6/12/96    to R; native kidney intact    Transplanted kidney     URI (upper respiratory infection)       Past Surgical History:   Procedure Laterality Date    CABG, ARTERY-VEIN, FOUR  [facility]    [facility]    HX AMPUTATION  2005    l ft toe off 1st then L BKA    HX CATARACT REMOVAL  ,     bilateral    HX RETINAL DETACHMENT REPAIR  ,     bilateral    HX UROLOGICAL      L nephrectomy due to CA    HX VASCULAR ACCESS      left arm not functioning    TRANSPLANT KIDNEY+RECIP NEPHREC  1996    right      Allergies   Allergen Reactions    Chlorhexidine Contact Dermatitis      Social History     Tobacco Use    Smoking status: Former Smoker     Packs/day: 1.00     Years: 30.00     Pack years: 30.00     Types: Cigarettes, Cigars     Last attempt to quit: 2009     Years since quittin.7    Smokeless tobacco: Never Used    Tobacco comment:    Substance Use Topics    Alcohol use: No      Family History   Problem Relation Age of Onset    Heart Attack Mother     Diabetes Mother     Heart Disease Mother     Diabetes Sister     Diabetes Brother     Hypertension Brother     Stroke Brother     Glaucoma Father         Objective:     Visit Vitals  /78   Pulse 68   Temp 98.7 °F (37.1 °C)   Resp 18   Ht 6' 2\" (1.88 m)   Wt 154.2 kg (340 lb)   SpO2 94%   BMI 43.65 kg/m²      Temp (24hrs), Av.7 °F (37.1 °C), Min:98.7 °F (37.1 °C), Max:98.7 °F (37.1 °C)    Oxygen Therapy  O2 Sat (%): 94 % (09/15/19 1347)  Pulse via Oximetry: 69 beats per minute (09/15/19 1347)  O2 Device: Room air (09/15/19 1244)  Physical Exam:  General:    Drowsy, cooperative, obese. Head:   Normocephalic, without obvious abnormality, atraumatic. Nose:  Nares normal. No drainage or sinus tenderness. Lungs:   CTA, resp even and nonlabored  Heart:  S1S2 present without murmurs rubs gallops. RRR. 2+ RLE edema. Left BKA. Abdomen:   Soft, non-tender. obese. Bowel sounds normal. No   Extremities: No cyanosis. Left BKA with prosthesis. Right great toe with large area of necrosis covered in betadine, right 2nd dose with small area of necrosis  Skin:     Texture, turgor normal. No rashes or lesions. Not Jaundiced  Neurologic: Drowsy, oriented X3.  No focal deficits      Data Review:   Recent Results (from the past 24 hour(s))   CBC WITH AUTOMATED DIFF    Collection Time: 09/15/19 10:16 AM   Result Value Ref Range    WBC 11.3 (H) 4.3 - 11.1 K/uL    RBC 4.03 (L) 4.23 - 5.6 M/uL    HGB 11.3 (L) 13.6 - 17.2 g/dL    HCT 38.0 (L) 41.1 - 50.3 %    MCV 94.3 79.6 - 97.8 FL    MCH 28.0 26.1 - 32.9 PG    MCHC 29.7 (L) 31.4 - 35.0 g/dL    RDW 18.9 (H) 11.9 - 14.6 %    PLATELET 173 293 - 025 K/uL    MPV 10.5 9.4 - 12.3 FL    ABSOLUTE NRBC 0.00 0.0 - 0.2 K/uL    DF AUTOMATED      NEUTROPHILS 75 43 - 78 %    LYMPHOCYTES 14 13 - 44 %    MONOCYTES 10 4.0 - 12.0 %    EOSINOPHILS 0 (L) 0.5 - 7.8 %    BASOPHILS 0 0.0 - 2.0 %    IMMATURE GRANULOCYTES 0 0.0 - 5.0 %    ABS. NEUTROPHILS 8.5 (H) 1.7 - 8.2 K/UL    ABS. LYMPHOCYTES 1.6 0.5 - 4.6 K/UL    ABS. MONOCYTES 1.1 0.1 - 1.3 K/UL    ABS. EOSINOPHILS 0.0 0.0 - 0.8 K/UL    ABS. BASOPHILS 0.0 0.0 - 0.2 K/UL    ABS. IMM. GRANS. 0.0 0.0 - 0.5 K/UL   METABOLIC PANEL, COMPREHENSIVE    Collection Time: 09/15/19 10:16 AM   Result Value Ref Range    Sodium 148 (H) 136 - 145 mmol/L    Potassium 3.9 3.5 - 5.1 mmol/L    Chloride 110 (H) 98 - 107 mmol/L    CO2 30 21 - 32 mmol/L    Anion gap 8 7 - 16 mmol/L    Glucose 93 65 - 100 mg/dL    BUN 28 (H) 8 - 23 MG/DL    Creatinine 1.52 (H) 0.8 - 1.5 MG/DL    GFR est AA 57 (L) >60 ml/min/1.73m2    GFR est non-AA 47 (L) >60 ml/min/1.73m2    Calcium 9.7 8.3 - 10.4 MG/DL    Bilirubin, total 0.4 0.2 - 1.1 MG/DL    ALT (SGPT) 16 12 - 65 U/L    AST (SGOT) 18 15 - 37 U/L    Alk.  phosphatase 67 50 - 136 U/L    Protein, total 7.0 6.3 - 8.2 g/dL    Albumin 3.0 (L) 3.2 - 4.6 g/dL    Globulin 4.0 (H) 2.3 - 3.5 g/dL    A-G Ratio 0.8 (L) 1.2 - 3.5     MAGNESIUM    Collection Time: 09/15/19 10:16 AM   Result Value Ref Range    Magnesium 2.2 1.8 - 2.4 mg/dL   PTT    Collection Time: 09/15/19 10:16 AM   Result Value Ref Range    aPTT 29.0 24.7 - 39.8 SEC   TROPONIN I    Collection Time: 09/15/19 10:16 AM   Result Value Ref Range    Troponin-I, Qt. 0.04 0.02 - 0.05 NG/ML   TSH 3RD GENERATION    Collection Time: 09/15/19 10:16 AM   Result Value Ref Range    TSH 1.160 0.358 - 3.740 uIU/mL   PROTHROMBIN TIME + INR    Collection Time: 09/15/19 10:16 AM   Result Value Ref Range    Prothrombin time 15.6 (H) 11.7 - 14.5 sec    INR 1.2     BNP    Collection Time: 09/15/19 11:16 AM   Result Value Ref Range     (H) 0 pg/mL   URINE MICROSCOPIC    Collection Time: 09/15/19 11:21 AM   Result Value Ref Range    WBC >100 (H) 0 /hpf    RBC >100 (H) 0 /hpf    Epithelial cells 0-3 0 /hpf    Bacteria 2+ (H) 0 /hpf    Casts 0-3 0 /lpf   EKG, 12 LEAD, INITIAL    Collection Time: 09/15/19 11:27 AM   Result Value Ref Range    Ventricular Rate 71 BPM    Atrial Rate 277 BPM    QRS Duration 130 ms    Q-T Interval 436 ms    QTC Calculation (Bezet) 473 ms    Calculated R Axis -8 degrees    Calculated T Axis 171 degrees    Diagnosis       !! AGE AND GENDER SPECIFIC ECG ANALYSIS !! Wide QRS rhythm  Left ventricular hypertrophy with QRS widening and repolarization abnormality  Abnormal ECG  When compared with ECG of 24-DEC-2016 12:24,  Wide QRS rhythm has replaced Sinus rhythm     TYPE & SCREEN    Collection Time: 09/15/19 11:31 AM   Result Value Ref Range    Crossmatch Expiration 09/18/2019     ABO/Rh(D) B POSITIVE     Antibody screen NEG    POC LACTIC ACID    Collection Time: 09/15/19 11:34 AM   Result Value Ref Range    Lactic Acid (POC) 1.46 0.5 - 1.9 mmol/L       Assessment and Plan:      Active Hospital Problems    Diagnosis Date Noted    UTI (urinary tract infection) 09/15/2019    Diabetic ulcer of toe of right foot associated with type 2 diabetes mellitus ([facility] 03/15/2019    Obesity, morbid ([facility] 12/19/2018    Iron deficiency anemia 12/19/2018    Chronic diastolic heart failure (HCC)     Atrial fibrillation ([facility] 12/09/2015    ILD (interstitial lung disease) ([facility] 08/27/2015    DM [facility]diabetes mellitus) [facility] 06/16/2015    Hx of BKA ([facility] 06/16/2015    Glaucoma 12/29/2009    Hypertension 12/29/2009     Last Assessment & Plan:   Controlled on current regimen    Last Assessment & Plan:   Controlled on current regimen.  ALDEN (Obstructive Sleep Apnea), uses home CPAP 12/29/2009     Admit to medical floor    Complicated UTI in the setting of renal transplant  Start 2 gm Rocephin IV  Send urine cx  Send BC X2  Obtain CT urogram with hematuria, UTI, hx renal transplant    Acute resp failure with hypoxia/fluid overload  Requiring 2 L O2, not on home O2  Lasix 60 mg IV BID  Strict I/Os  CXR with mild interstitial edema, no signs of infections  Rocephin 2 gm will cover for pneumonia and UTI  Repeat CXR in AM  Follow[facility] [place] [facility] [facility] and [facility] outpatient    Afib  Continue eliquis  Continue sotalol, cardizem    DM II  Check A1C  Continue home regimen verified with wife of Lantus 45 units BID, humalog 12 units before breakfast, 22 units before lunch, 28 units before dinner    Renal cell carcinoma s/p renal transplant  In the setting of acute infection would not typically continue anti-rejection meds, pt does not appear septic, wife request meds continued.   If pt becomes clinically septic will need to stop anti-rejection meds  Followed by Dr. Jason Olson, Nephrology   On prednisone, continue    ILD  Scheduled albuterol nebs  CXR in AM  Last Saw Dr. Alla Duran, Pulmonary approx 2 weeks ago     Hypernatremia  Likely secondary to fluid overload  Lasix BID  BMP in AM    HTN  Continue current regimen    ALDEN  Use home CPAP with naps and at night    Iron deficiency anemia  Continue home supplementation    Dry gangrene right great and 2nd toe  Consult wound care  Does not appear infected      Home meds reconciled myself with wife and bottles of medication  Notes, labs, VS, diagnostic testing reviewed  Time spent with pt 30 min  DVT prophylaxis:  eliquis  Code DNR discussed with wife and pt  Surrogate decision maker:  wife  Estimated length of stay: >2MN  Case discussed with pt, care team, Dr. Luisa Morales, wife and family at bedside    Nilay Pratt NP

## 2019-09-16 NOTE — PROGRESS NOTES
09/16/19 0259   EENT   EENT (WDL) X   Left Eye Drainage(comment)  (pink tinged mucous)     Pt wife stated Pt has drain to L eye to keep ocular pressure down. When wife wiped eye to remove mucus, pink tinged drainage noted and witnessed by this RN. No obvious signs of bleeding/tear in eye, urine, or other areas. MD MEL Young paged/aware. Will continue to monitor.

## 2019-09-16 NOTE — PROGRESS NOTES
Initial visit by  to convey care and concern and encourage patient that  services are available if desired. No needs were voiced during the visit. Provided 's business card for future reference. Chaplains remain available for support.      Vaughn Gordon 69  Board Certified

## 2019-09-16 NOTE — PROGRESS NOTES
Problem: Mobility Impaired (Adult and Pediatric)  Goal: *Acute Goals and Plan of Care (Insert Text)  Description  LTG:  (1.)Mr. Baron Schroeder will move from supine to sit and sit to supine , scoot up and down and roll side to side with MINIMAL ASSIST within 7 treatment day(s). (2.)Mr. Baron Schroeder will perform supine and/or seated exercises and functional activities for 10+ minutes to improve strength and mobility within 7 days. (3.)Mr. Baron Schroeder will transfer from bed to chair and chair to bed with MAXIMAL ASSIST using the least restrictive device within 7 treatment day(s). (4.)Mr. Baron Schroeder will ambulate with MAXIMAL ASSIST for 5+ feet using least restrictive device within 7 days. ________________________________________________________________________________________________   Outcome: Progressing Towards Goal     PHYSICAL THERAPY: Initial Assessment and AM 9/16/2019  INPATIENT: PT Visit Days : 1  [place] / [place] / Product Type: Medicare /       NAME/AGE/GENDER: Sacha Quiroz is a 67 y.o. male   PRIMARY DIAGNOSIS: UTI (urinary tract infection) [N39.0] UTI (urinary tract infection)   UTI (urinary tract infection)          ICD-10: Treatment Diagnosis:    Generalized Muscle Weakness (M62.81)  Difficulty in walking, Not elsewhere classified (R26.2)  Other abnormalities of gait and mobility (R26.89)   Precaution/Allergies:  Chlorhexidine      ASSESSMENT:     Mr. Baron Schroeder is a 67year old male admitted from home for UTI, hypoxia. 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 Wife reports that he is typically ambulatory for household distances using rolling walker. Pt presents in supine without specific complaints; appears somewhat drowsy and has difficulty participating with therapy at times due to what appear to be some altered mental status and cognitive deficits. Wife is very helpful and involved.  She assists with all care at home. Pt requires mod-max assist for bed mobility (rolling, scooting, and supine to sit transfer). He demonstrates essentially poor seated balance with strong posterior trunk lean and unable to sit unsupported for any length of time. Great difficulty + max assist needed for seated mobility, scooting, and attempts at positioning. Due to poor balance and significant weakness, standing not attempted today. Pt appears anxious and SOB in sitting. Requires heavy max assist of 2 for return to supine and repositioning. O2 sats 89% on room air after activity. Pt reports needing to have BM so assisted with placing bedpan requiring mod assist of 1-2 for rolling and turning. Left with needs in reach and wife present. Hilda Douglass appears to be functioning well below baseline with strength and mobility at this time; seems very weak with poor balance and poor activity tolerance requiring heavy assist for bed mobility. He will need continued therapy during hospital stay and possibly rehab at [place] pending progress. This section established at most recent assessment   PROBLEM LIST (Impairments causing functional limitations):  Decreased Strength  Decreased Transfer Abilities  Decreased Ambulation Ability/Technique  Decreased Balance  Decreased Activity Tolerance  Decreased Pacing Skills  Increased Shortness of Breath  Decreased Cognition   INTERVENTIONS PLANNED: (Benefits and precautions of physical therapy have been discussed with the patient.)  Balance Exercise  Bed Mobility  Gait Training  Home Exercise Program (HEP)  Therapeutic Activites  Therapeutic Exercise/Strengthening  Transfer Training     TREATMENT PLAN: Frequency/Duration: 3 times a week for duration of hospital stay  Rehabilitation Potential For Stated Goals: [facility] (at time of discharge pending progress):    Placement:   It is my opinion, based on this patient's performance to date, that Mr. Gianni Boland may benefit from participating in 1-2 additional therapy sessions in order to continue to assess for rehab potential and then make recommendation for disposition at discharge. Currently seems pt needs rehab. Equipment:   TBD pending progress              HISTORY:   History of Present Injury/Illness (Reason for Referral):  Per H&P, \"Mr. Baron Schroeder is a 66 yo male with PMH of afib on eliquis, HTN, ALDEN on CPAP, diastolic CHF last EF 85-61% Aug 2019, ILD followed by [facility]monary, renal cell carcinoma s/p renal transplant left, dry gangrene currently being tx by outpatient wound care right great toe and second toe who presented with c/o fever, urinary incontinence, dysuria. Also c/o cough which he has been seen for last week. Cough is productive with white sputum with red streaks. UA +UTI. CXR with mild interstitial edema, cardiomegaly, unchanged small left pleural effusion. Pt found to be hypoxic with RA sats 87-88%, improved with 2 L O2 via NC. Pt drowsy, wife states he did not sleep last night. Pt denies CP, SOB, sick contact, abd pain. \"    Past Medical History/Comorbidities:   Mr. Baron Schroeder  has a past medical history of Abnormal cardiovascular function study, Abnormal EKG, Allergic rhinitis (8/27/2015), Anemia, ARF (acute renal failure) ([address] (3/23/2010), Atrial fibrillation ([address] (12/9/2015), Atrial fibrillation with rapid ventricular response ([address] (1/23/2014), CAD (Coronary Artery Disease), Native Coronary Artery, CABG x4 (12/29/2009), Cancer ([address] Chest pain, Chronic constipation, Chronic diastolic heart failure ([address] Chronic kidney disease, Cough, Diabetes mellitus with background retinopathy ([address] Diabetes Mellitus, insulin dependent (12/29/2009), Dyspnea on exertion, Erectile dysfunction/impotence, Gastroenteritis, acute (3/23/2010), Gastroesophageal reflux (12/9/2015), Glaucoma (12/29/2009), HTN (hypertension) (12/29/2009), Hyperlipidemia (12/9/2015), ILD (interstitial lung disease) ([address] (8/27/2015), Long-term current use of steroids (3/18/2015), Morbid obesity with body mass index of 45.0-49.9 in adult [facility]) (1/21/2014), Need for prophylactic antibiotic (3/18/2015), ALDEN (Obstructive Sleep Apnea), uses home CPAP (12/29/2009), Paroxysmal SVT/rapid atrial  (supraventricular tachycardia) ([facility] Personal history of tobacco use (3/18/2015), Pneumonia, Pneumonia, organism unspecified(486) (12/30/2009), Premature ventricular contraction, Reflux esophagitis - (GERD), Renal cancer ([facility] (9/13/2010), Restrictive lung disease (3/18/2015), Retinopathy (12/9/2015), S/P CABG (coronary artery bypass graft), Status post kidney transplant (12/29/2009), Transplant, kidney (6/12/96), Transplanted kidney, and URI (upper respiratory infection). He also has no past medical history of Stroke [facility]). Mr. Kaylin Chester  has a past surgical history that includes hx cataract removal (1995, 2005); hx retinal detachment repair (1988, 1989); hx vascular access (1995); pr cabg, artery-vein, four (2002); pr transplant kidney+recip nephrec (6/12/1996); hx urological; and hx amputation (2005). Social History/Living Environment:   Home Environment: Private residence  # Steps to Enter: 3  Rails to Enter: Yes  Hand Rails : Right  Wheelchair Ramp: No  One/Two Story Residence: One story  Living Alone: No  Support Systems: Spouse/Significant Other/Partner  Patient Expects to be Discharged to[de-identified] Unknown  Current DME Used/Available at Home: Shower chair, Walker, rolling, Wheelchair, Commode, bedside  Prior Level of Function/Work/Activity:  Lives with wife. Mod I with RW around house and uses L LE prosthesis. No recent falls.       Number of Personal Factors/Comorbidities that affect the Plan of Care: 1-2: MODERATE COMPLEXITY   EXAMINATION:   Most Recent Physical Functioning:   Gross Assessment:  AROM: Generally decreased, functional  Strength: Generally decreased, functional  Coordination: Generally decreased, functional               Posture:  Posture (WDL): Exceptions to WDL  Posture Assessment: Forward head, Rounded shoulders(w/strong posterior trunk lean in sitting)  Balance:  Sitting: Impaired  Sitting - Static: Poor (constant support)  Sitting - Dynamic: Poor (constant support) Bed Mobility:  Rolling: Moderate assistance;Maximum assistance  Supine to Sit: Maximum assistance  Sit to Supine: Maximum assistance  Scooting: Maximum assistance  Interventions: Verbal cues; Visual cues; Safety awareness training; Tactile cues;Manual cues  Duration: 9 Minutes  Wheelchair Mobility:     Transfers:     Gait:            Body Structures Involved:  Muscles Body Functions Affected:  Mental  Movement Related Activities and Participation Affected:  General Tasks and Demands  Mobility  Domestic Life  Community, Social and [address] [place] [address]   Number of elements that affect the Plan of Care: 4+: HIGH COMPLEXITY   CLINICAL PRESENTATION:   Presentation: Evolving clinical presentation with changing clinical characteristics: MODERATE COMPLEXITY   CLINICAL DECISION MAKIN [facility] Mobility Inpatient Short Form  How much difficulty does the patient currently have. .. Unable A Lot A Little None   1. Turning over in bed (including adjusting bedclothes, sheets and blankets)? ? 1   ? 2   ? 3   ? 4   2. Sitting down on and standing up from a chair with arms ( e.g., wheelchair, bedside commode, etc.)   ? 1   ? 2   ? 3   ? 4   3. Moving from lying on back to sitting on the side of the bed?   ? 1   ? 2   ? 3   ? 4   How much help from another person does the patient currently need. .. Total A Lot A Little None   4. Moving to and from a bed to a chair (including a wheelchair)? ? 1   ? 2   ? 3   ? 4   5. Need to walk in hospital room? ? 1   ? 2   ? 3   ? 4   6. Climbing 3-5 steps with a railing? ? 1   ? 2   ? 3   ? 4   © 2007, Trustees of [address] [place] [address] [address] [id number], under license to [employer].  All rights reserved      Score:  Initial: 8 Most Recent: X (Date: -- )    Interpretation of Tool:  Represents activities that are increasingly more difficult (i.e. Bed mobility, Transfers, Gait). Medical Necessity:     Patient demonstrates fair   rehab potential due to higher previous functional level. Reason for Services/Other Comments:  Patient continues to demonstrate capacity to improve strength, balance, activity tolerance which will increase independence, decrease amount of assistance required from caregiver and increase safety  . Use of outcome tool(s) and clinical judgement create a POC that gives a: Questionable prediction of patient's progress: MODERATE COMPLEXITY            TREATMENT:   (In addition to Assessment/Re-Assessment sessions the following treatments were rendered)   Pre-treatment Symptoms/Complaints:  \"thank you  Pain: Initial:   Pain Intensity 1: 0  Post Session:  0/10     Therapeutic Activity: (9 Minutes   ):  Therapeutic activities including Bed mobility (rolling, scooting, supine<->sit), seated balance and functional activities, attempts at repositioning mobility, strength, balance and activity tolerance . Required maximal assistance and cueing (manual, tactile, verbal)   to promote static and dynamic balance in standing and promote motor control of bilateral, lower extremity(s). Braces/Orthotics/Lines/Etc:   O2 Device: Room air  Treatment/Session Assessment:    Response to Treatment:  pt requires mostly max assist for bed mobility, poor sitting balance and poor activity tolerance  Interdisciplinary Collaboration:   Physical Therapist  Registered Nurse  After treatment position/precautions:   Supine in bed  Bed/Chair-wheels locked  Bed in low position  Call light within reach  RN notified  Family at bedside   Compliance with Program/Exercises: Will assess as treatment progresses  Recommendations/Intent for next treatment session: \"Next visit will focus on advancements to more challenging activities and reduction in assistance provided\".   Total Treatment Duration:  PT Patient Time In/Time Out  Time In: 1020  Time Out: [facility], T

## 2019-09-16 NOTE — PROGRESS NOTES
Problem: Self Care Deficits Care Plan (Adult)  Goal: *Acute Goals and Plan of Care (Insert Text)  Description  1. Patient will complete upper body bathing and dressing with minimal assistance  and adaptive equipment as needed. 2. Patient will complete sitting edge of bed with minimal assistance for 8 minutes to improve sitting tolerance and balance for ADL. 3. Patient will tolerate 23 minutes of OT treatment with 2-3 rest breaks to increase activity tolerance for ADLs. 4. Patient will complete rolling side to side with minimal assistance and adaptive equipment as needed. 5. Patient will attempt standing with L prosthetic limb and use of a rolling walker with 3 visits to demonstrate advancement with functional transfers. Timeframe: 7 visits      Outcome: Progressing Towards Goal     OCCUPATIONAL THERAPY: Initial Assessment, Daily Note and AM 9/16/2019  INPATIENT: OT Visit Days: 1  [place] / [place] / Product Type: Medicare /      NAME/AGE/GENDER: Donna Kendall is a 67 y.o. male   PRIMARY DIAGNOSIS:  UTI (urinary tract infection) [N39.0] UTI (urinary tract infection)   UTI (urinary tract infection)          ICD-10: Treatment Diagnosis:    Generalized Muscle Weakness (M62.81)  Other lack of cordination (R27.8)   Precautions/Allergies:     Chlorhexidine      ASSESSMENT:     Mr. Faiza Khan was admitted with UTI. Pt lives with his wife in a single story home and pt needs assistance with bathing/dressing/toileting at baseline. Pt has prosthetic limb for L BKA and shoe for RLE. Pt normally completes functional mobility for short distances using a rolling walker. Pt's wife assists him with bathing at the toilet. This session, pt presented supine in the bed upon arrival with supportive wife at bedside. Pt is alert and agreeable to OT assessment and treatment. Pt demonstrated deficits in activity tolerance, shortness of breath, strength, and balance impacting ADLs.  Pt transferred to the edge of the bed with maximal assistance x 2. Pt needs cueing for all extremities to transition to the edge of the bed. Pt demonstrates poor sitting balance with pt leaning posteriorly. Pt worked on bringing trunk forward with pt scooting to the edge of the bed with maximal assistance x 2. Pt appeared anxious and short of breath and suddenly just starts laying back into the bed (straight back vs going onto his side). Pt's O2 at 84% after exertion with pt educated on pursed lip breathing. Pt's O2 levels did come up with a short rest break. Pt was able to assist to adjust his upper body in the bed with moderate assistance. At the end of the session, pt was left supine in the bed with needs in reach. Pt's wife appears more receptive to rehab at this point after witnessing how much assistance patient is requiring at this time. Pt's wife would not be able to safely assist him at this time. Pt presented below functional baseline and would benefit from skilled OT services to address deficits.        This section established at most recent assessment   PROBLEM LIST (Impairments causing functional limitations):  Decreased Strength  Decreased ADL/Functional Activities  Decreased Transfer Abilities  Decreased Ambulation Ability/Technique  Decreased Balance  Decreased Activity Tolerance  Decreased Pacing Skills  Decreased Work Simplification/Energy Conservation Techniques  Increased Fatigue  Increased Shortness of Breath  Decreased Flexibility/Joint Mobility  Edema/Girth  Decreased Skin Integrity/Hygeine  Decreased [place] with Home Exercise Program  Decreased Cognition   INTERVENTIONS PLANNED: (Benefits and precautions of occupational therapy have been discussed with the patient.)  Activities of daily living training  Adaptive equipment training  Balance training  Clothing management  Cognitive training  Donning&doffing training  Neuromuscular re-eduation  Therapeutic activity  Therapeutic exercise TREATMENT PLAN: Frequency/Duration: Follow patient 3 times per week to address above goals. Rehabilitation Potential For Stated Goals: Good     REHAB RECOMMENDATIONS (at time of discharge pending progress):    Placement: It is my opinion, based on this patient's performance to date, that Mr. Emily Lanier may benefit from intensive therapy at a [address] [place] [address] after discharge due to the functional deficits listed above that are likely to improve with skilled rehabilitation and concerns that he/she may be unsafe to be unsupervised at home due to risk for falls and further functional decline .   Equipment:   TBD               OCCUPATIONAL PROFILE AND HISTORY:   History of Present Injury/Illness (Reason for Referral):  See H&P  Past Medical History/Comorbidities:   Mr. Emily Lanier  has a past medical history of Abnormal cardiovascular function study, Abnormal EKG, Allergic rhinitis (8/27/2015), Anemia, ARF (acute renal failure) ([address] (3/23/2010), Atrial fibrillation ([address] (12/9/2015), Atrial fibrillation with rapid ventricular response ([address] (1/23/2014), CAD (Coronary Artery Disease), Native Coronary Artery, CABG x4 (12/29/2009), Cancer ([address] Chest pain, Chronic constipation, Chronic diastolic heart failure ([address] Chronic kidney disease, Cough, Diabetes mellitus with background retinopathy ([address] Diabetes Mellitus, insulin dependent (12/29/2009), Dyspnea on exertion, Erectile dysfunction/impotence, Gastroenteritis, acute (3/23/2010), Gastroesophageal reflux (12/9/2015), Glaucoma (12/29/2009), HTN (hypertension) (12/29/2009), Hyperlipidemia (12/9/2015), ILD (interstitial lung disease) ([address] (8/27/2015), Long-term current use of steroids (3/18/2015), Morbid obesity with body mass index of 45.0-49.9 in adult [facility]) (1/21/2014), Need for prophylactic antibiotic (3/18/2015), ALDEN (Obstructive Sleep Apnea), uses home CPAP (12/29/2009), Paroxysmal SVT/rapid atrial  (supraventricular tachycardia) ([address] Personal history of tobacco use (3/18/2015), Pneumonia, Pneumonia, organism unspecified(486) (12/30/2009), Premature ventricular contraction, Reflux esophagitis - (GERD), Renal cancer ([facility] (9/13/2010), Restrictive lung disease (3/18/2015), Retinopathy (12/9/2015), S/P CABG (coronary artery bypass graft), Status post kidney transplant (12/29/2009), Transplant, kidney (6/12/96), Transplanted kidney, and URI (upper respiratory infection). He also has no past medical history of Stroke [facility]). Mr. Amador Myles  has a past surgical history that includes hx cataract removal (1995, 2005); hx retinal detachment repair (1988, 1989); hx vascular access (1995); pr cabg, artery-vein, four (2002); pr transplant kidney+recip nephrec (6/12/1996); hx urological; and hx amputation (2005). Social History/Living Environment:   Home Environment: Private residence  # Steps to Enter: 3  Rails to Enter: Yes  Hand Rails : Right  Wheelchair Ramp: No  One/Two Story Residence: One story  Living Alone: No  Support Systems: Spouse/Significant Other/Partner  Patient Expects to be Discharged to[de-identified] Unknown  Current DME Used/Available at Home: Cane, straight, Raised toilet seat, Safety frame toliet, Walker, rolling, Wheelchair  Prior Level of Function/Work/Activity:  Pt lives with his wife in a single story home and pt needs assistance with bathing/dressing/toileting at baseline. Pt has prosthetic limb for L BKA and shoe for RLE. Pt normally completes functional mobility for short distances using a rolling walker. Pt's wife assists him with bathing at the toilet.    Personal Factors:          Social Background:  lives with wife that assists him         Past/Current Experience:  UTI and wife reports recent hx of pneumonia         Other factors that influence how disability is experienced by the patient:  multiple co-morbidities     Number of Personal Factors/Comorbidities that affect the Plan of Care: Extensive review of physical, cognitive, and psychosocial performance (3+): HIGH COMPLEXITY   ASSESSMENT OF OCCUPATIONAL PERFORMANCE[de-identified]   Activities of Daily Living:   Basic ADLs (From Assessment) Complex ADLs (From Assessment)   Feeding: Stand-by assistance  Oral Facial Hygiene/Grooming: Minimum assistance  Bathing: Maximum assistance  Upper Body Dressing: Maximum assistance  Lower Body Dressing: Total assistance  Toileting: Total assistance Instrumental ADL  Meal Preparation: Total assistance  Homemaking: Total assistance   Grooming/Bathing/Dressing Activities of Daily Living     Cognitive Retraining  Safety/Judgement: Fall prevention                       Bed/Mat Mobility  Rolling: Moderate assistance;Maximum assistance  Supine to Sit: Maximum assistance;Assist x2  Sit to Supine: Maximum assistance;Assist x2  Scooting: Maximum assistance;Assist x2     Most Recent Physical Functioning:   Gross Assessment:  AROM: Within functional limits  Strength: Within functional limits  Coordination: Generally decreased, functional               Posture:  Posture (WDL): Exceptions to WDL  Posture Assessment: Forward head, Rounded shoulders(w/strong posterior trunk lean in sitting)  Balance:  Sitting: Impaired  Sitting - Static: Poor (constant support)  Sitting - Dynamic: Poor (constant support) Bed Mobility:  Rolling: Moderate assistance;Maximum assistance  Supine to Sit: Maximum assistance;Assist x2  Sit to Supine: Maximum assistance;Assist x2  Scooting: Maximum assistance;Assist x2  Interventions: Verbal cues; Visual cues; Safety awareness training; Tactile cues;Manual cues  Duration: 9 Minutes  Wheelchair Mobility:     Transfers:               Patient Vitals for the past 6 hrs:   BP BP Patient Position SpO2 Pulse   09/16/19 1040 -- -- (!) 89 % --   09/16/19 1102 134/62 At rest 90 % 69   09/16/19 1524 -- -- (!) 89 % --       Mental Status  Neurologic State: Alert  Orientation Level: Oriented to person  Cognition: Decreased attention/concentration, Follows commands  Perception: Cues to maintain midline in sitting  Perseveration: No perseveration noted  Safety/Judgement: Fall prevention                          Physical Skills Involved:  Balance  Strength  Activity Tolerance Cognitive Skills Affected (resulting in the inability to perform in a timely and safe manner):  Executive Function  Sustained Attention Psychosocial Skills Affected:  Habits/Routines  Self-Awareness   Number of elements that affect the Plan of Care: 5+:  HIGH COMPLEXITY   CLINICAL DECISION MAKIN[address] [place] [address] AM-PAC 6 Clicks   Daily Activity Inpatient Short Form  How much help from another person does the patient currently need. .. Total A Lot A Little None   1. Putting on and taking off regular lower body clothing? ? 1   ? 2   ? 3   ? 4   2. Bathing (including washing, rinsing, drying)? ? 1   ? 2   ? 3   ? 4   3. Toileting, which includes using toilet, bedpan or urinal?   ? 1   ? 2   ? 3   ? 4   4. Putting on and taking off regular upper body clothing? ? 1   ? 2   ? 3   ? 4   5. Taking care of personal grooming such as brushing teeth? ? 1   ? 2   ? 3   ? 4   6. Eating meals? ? 1   ? 2   ? 3   ? 4   © , Trustees of [address] [place] [address], under license to [employer]. All rights reserved      Score:  Initial: 12 Most Recent: X (Date: -- )    Interpretation of Tool:  Represents activities that are increasingly more difficult (i.e. Bed mobility, Transfers, Gait). Medical Necessity:     Patient demonstrates good   rehab potential due to higher previous functional level. Reason for Services/Other Comments:  Patient continues to require skilled intervention due to decreased independence and activity tolerance for ADL/functional transfers/functional mobility.    .   Use of outcome tool(s) and clinical judgement create a POC that gives a: MODERATE COMPLEXITY         TREATMENT:   (In addition to Assessment/Re-Assessment sessions the following treatments were rendered)     Pre-treatment Symptoms/Complaints:    Pain: Initial:   Pain Intensity 1: 0  Post Session:  0/10     Therapeutic Activity: (8 minutes): Therapeutic activities including Bed transfers and sitting balance edge of bed with education of pursed lip breathing  to improve mobility, strength, balance and coordination. Required maximal assistance x 2   to promote static balance in sitting. N/a     Braces/Orthotics/Lines/Etc:   IV  O2 Device: Room air  Treatment/Session Assessment:    Response to Treatment:  Pt tolerated with decreased activity tolerance, safety, and shortness of breath. Interdisciplinary Collaboration:   Occupational Therapist  Registered Nurse  After treatment position/precautions:   Supine in bed  Bed/Chair-wheels locked  Call light within reach  RN notified  Family at bedside   Compliance with Program/Exercises: Will assess as treatment progresses. Recommendations/Intent for next treatment session: \"Next visit will focus on advancements to more challenging activities and reduction in assistance provided\".   Total Treatment Duration:  OT Patient Time In/Time Out  Time In: 1504  Time Out: [facility], OT

## 2019-09-16 NOTE — PROGRESS NOTES
Met with patient and wife at bedside. Patient currently lives at home with wife. Wife is Oscar Bailey, [phone]. Prior to admission, patient was independent with adls. Patient has a walker, wheelchair, cane, and lift chair at home. Discussed discharge plan with family. Awaiting therapy to eval to determine discharge needs. Provided wife with SNF list to review. CM will continue to follow. Care Management Interventions  PCP Verified by CM: Yes  Mode of Transport at Discharge:  Other (see comment)  Transition of Care Consult (CM Consult): Discharge Planning  Discharge Durable Medical Equipment: No  Physical Therapy Consult: Yes  Occupational Therapy Consult: Yes  Current Support Network: Lives with Spouse  Confirm Follow Up Transport: Family  Plan discussed with Pt/Family/Caregiver: Yes  Freedom of Choice Offered: Yes  Discharge Location  Discharge Placement: Unable to determine at this time

## 2019-09-16 NOTE — PROGRESS NOTES
Interdisciplinary Rounds completed 09/16/19. Nursing, Case Management, Physician and PT present. Plan of care reviewed and updated. Possible rehab at discharge.

## 2019-09-16 NOTE — WOUND CARE
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 Chronic hemosiderosis and scars from venous stasis ulcers lower right leg. Continue compression garment from home for right leg edema. Will loosely follow.

## 2019-09-16 NOTE — PROGRESS NOTES
Hourly rounds completed this shift. All needs met. Bed low/locked. No c/o pain. Wife at bedside. Call light in reach. Will continue to monitor pt and give report to oncoming RN.      Peripheral IV 09/15/19 Right Antecubital (Active)   Site Assessment Clean, dry, & intact 9/16/2019 11:41 AM   Phlebitis Assessment 0 9/16/2019  2:37 PM   Infiltration Assessment 0 9/16/2019  2:37 PM   Dressing Status Clean, dry, & intact 9/16/2019  2:37 PM   Dressing Type Transparent;Tape 9/16/2019  2:37 PM   Hub Color/Line Status Pink;Flushed;Patent 9/16/2019  2:37 PM   Alcohol Cap Used No 9/15/2019  2:30 PM

## 2019-09-16 NOTE — PROGRESS NOTES
Hospitalist Progress Note     Admit Date:  9/15/2019 10:52 AM   Name:  Mai Mathews   Age:  67 y.o.  :  1940   MRN:  [id number]   PCP:  Alix Stapleton MD  Treatment Team: Attending Provider: Jeanine Lehman MD; Physical Therapist: BRAVO PerezT; Occupational Therapist: Kaylee Varma OT    Subjective:   HPI and or CC:  Mr. Yusef Zhou is a 66 yo male with PMH of afib on eliquis, HTN, ALDEN on CPAP, diastolic CHF last EF 77-30% Aug 2019, ILD followed by [facility], renal cell carcinoma s/p renal transplant left, dry gangrene currently being tx by outpatient wound care right great toe and second toe who presented with c/o fever, urinary incontinence, dysuria. Also c/o cough which he has been seen for last week. Cough is productive with white sputum with red streaks. UA +UTI. CXR with mild interstitial edema, cardiomegaly, unchanged small left pleural effusion. Pt found to be hypoxic with RA sats 87-88%, improved with 2 L O2 via NC. Pt drowsy, wife states he did not sleep last night. Pt denies CP, SOB, sick contact, abd pain.       : Patient alert and oriented x3. No distress. Spouse at bedside. Last echo done outpatient 2019 and per cardiology note, normal in appearance. WBC 12.6 today, afebrile. Urine culture, BC x2 results pending. Creatinine normal today at 1.36. Tolerating Lasix for Diastolic HF, . Renal urogram shows stable renal transplant. Wound care consulted for right great toe \"dry eschar\". Spouse states sees podiatry for this and Betadine applied daily.          Objective:     Patient Vitals for the past 24 hrs:   Temp Pulse Resp BP SpO2   19 0749     94 %   19 0639 99.1 °F (37.3 °C) 68 20 126/67 90 %   19 0500 98.9 °F (37.2 °C) 71 22 144/61 98 %   19 0137     96 %   19 0039 98 °F (36.7 °C) 62 20 154/67 95 %   09/15/19 2352  67      09/15/19 2009     97 %   09/15/19 2007 98 °F (36.7 °C) 64 20 146/71 93 %   09/15/19 1503 98.4 °F (36.9 °C) 66 18 156/66 95 %   09/15/19 1359  69  173/77 94 %   09/15/19 1347  68  182/78 94 %   09/15/19 1233  68   95 %   09/15/19 1056  75  155/69 94 %   09/15/19 1008 98.7 °F (37.1 °C) 78 18 144/72 93 %     Oxygen Therapy  O2 Sat (%): 94 % (09/16/19 0749)  Pulse via Oximetry: 71 beats per minute (09/16/19 0749)  O2 Device: Room air (09/16/19 0749)  O2 Flow Rate (L/min): 2 l/min (09/16/19 0137)  No intake or output data in the 24 hours ending 09/16/19 0835      REVIEW OF SYSTEMS: Comprehensive ROS performed and negative except as stated in HPI. Physical Examination:  General:       Drowsy, cooperative, obese. Head:            Normocephalic, without obvious abnormality, atraumatic. Nose:            Nares normal. No drainage or sinus tenderness. Lungs:          CTA, resp even and nonlabored  Heart:            S1S2 present without murmurs rubs gallops. RRR. 2+ RLE edema. Left BKA. Abdomen:    Soft, non-tender. obese. Bowel sounds normal. No   Extremities: No cyanosis. Left BKA with prosthesis. Right great toe with large area of necrosis covered in betadine, right 2nd dose with small area of necrosis  Skin:             Texture, turgor normal. No rashes or lesions. Not Jaundiced  Neurologic:  Drowsy, oriented X3. No focal deficits       Data Review:  I have reviewed all labs, meds, telemetry events, and studies from the last 24 hours.     Recent Results (from the past 24 hour(s))   CBC WITH AUTOMATED DIFF    Collection Time: 09/15/19 10:16 AM   Result Value Ref Range    WBC 11.3 (H) 4.3 - 11.1 K/uL    RBC 4.03 (L) 4.23 - 5.6 M/uL    HGB 11.3 (L) 13.6 - 17.2 g/dL    HCT 38.0 (L) 41.1 - 50.3 %    MCV 94.3 79.6 - 97.8 FL    MCH 28.0 26.1 - 32.9 PG    MCHC 29.7 (L) 31.4 - 35.0 g/dL    RDW 18.9 (H) 11.9 - 14.6 %    PLATELET 768 861 - 195 K/uL    MPV 10.5 9.4 - 12.3 FL    ABSOLUTE NRBC 0.00 0.0 - 0.2 K/uL    DF AUTOMATED      NEUTROPHILS 75 43 - 78 %    LYMPHOCYTES 14 13 - 44 %    MONOCYTES 10 4.0 - 12.0 %    EOSINOPHILS 0 (L) 0.5 - 7.8 %    BASOPHILS 0 0.0 - 2.0 %    IMMATURE GRANULOCYTES 0 0.0 - 5.0 %    ABS. NEUTROPHILS 8.5 (H) 1.7 - 8.2 K/UL    ABS. LYMPHOCYTES 1.6 0.5 - 4.6 K/UL    ABS. MONOCYTES 1.1 0.1 - 1.3 K/UL    ABS. EOSINOPHILS 0.0 0.0 - 0.8 K/UL    ABS. BASOPHILS 0.0 0.0 - 0.2 K/UL    ABS. IMM. GRANS. 0.0 0.0 - 0.5 K/UL   METABOLIC PANEL, COMPREHENSIVE    Collection Time: 09/15/19 10:16 AM   Result Value Ref Range    Sodium 148 (H) 136 - 145 mmol/L    Potassium 3.9 3.5 - 5.1 mmol/L    Chloride 110 (H) 98 - 107 mmol/L    CO2 30 21 - 32 mmol/L    Anion gap 8 7 - 16 mmol/L    Glucose 93 65 - 100 mg/dL    BUN 28 (H) 8 - 23 MG/DL    Creatinine 1.52 (H) 0.8 - 1.5 MG/DL    GFR est AA 57 (L) >60 ml/min/1.73m2    GFR est non-AA 47 (L) >60 ml/min/1.73m2    Calcium 9.7 8.3 - 10.4 MG/DL    Bilirubin, total 0.4 0.2 - 1.1 MG/DL    ALT (SGPT) 16 12 - 65 U/L    AST (SGOT) 18 15 - 37 U/L    Alk.  phosphatase 67 50 - 136 U/L    Protein, total 7.0 6.3 - 8.2 g/dL    Albumin 3.0 (L) 3.2 - 4.6 g/dL    Globulin 4.0 (H) 2.3 - 3.5 g/dL    A-G Ratio 0.8 (L) 1.2 - 3.5     MAGNESIUM    Collection Time: 09/15/19 10:16 AM   Result Value Ref Range    Magnesium 2.2 1.8 - 2.4 mg/dL   PTT    Collection Time: 09/15/19 10:16 AM   Result Value Ref Range    aPTT 29.0 24.7 - 39.8 SEC   TROPONIN I    Collection Time: 09/15/19 10:16 AM   Result Value Ref Range    Troponin-I, Qt. 0.04 0.02 - 0.05 NG/ML   TSH 3RD GENERATION    Collection Time: 09/15/19 10:16 AM   Result Value Ref Range    TSH 1.160 0.358 - 3.740 uIU/mL   PROTHROMBIN TIME + INR    Collection Time: 09/15/19 10:16 AM   Result Value Ref Range    Prothrombin time 15.6 (H) 11.7 - 14.5 sec    INR 1.2     BNP    Collection Time: 09/15/19 11:16 AM   Result Value Ref Range     (H) 0 pg/mL   URINE MICROSCOPIC    Collection Time: 09/15/19 11:21 AM   Result Value Ref Range    WBC >100 (H) 0 /hpf    RBC >100 (H) 0 /hpf    Epithelial cells 0-3 0 /hpf Bacteria 2+ (H) 0 /hpf    Casts 0-3 0 /lpf   CULTURE, URINE    Collection Time: 09/15/19 11:21 AM   Result Value Ref Range    Special Requests: NO SPECIAL REQUESTS      Culture result: (A)       >100,000 COLONIES/mL GRAM NEGATIVE RODS IDENTIFICATION AND SUSCEPTIBILITY TO FOLLOW   EKG, 12 LEAD, INITIAL    Collection Time: 09/15/19 11:27 AM   Result Value Ref Range    Ventricular Rate 71 BPM    Atrial Rate 277 BPM    QRS Duration 130 ms    Q-T Interval 436 ms    QTC Calculation (Bezet) 473 ms    Calculated R Axis -8 degrees    Calculated T Axis 171 degrees    Diagnosis       !! AGE AND GENDER SPECIFIC ECG ANALYSIS !!   Wide QRS rhythm  Left ventricular hypertrophy with QRS widening and repolarization abnormality  Abnormal ECG  When compared with ECG of 24-DEC-2016 12:24,  Wide QRS rhythm has replaced Sinus rhythm     TYPE & SCREEN    Collection Time: 09/15/19 11:31 AM   Result Value Ref Range    Crossmatch Expiration 09/18/2019     ABO/Rh(D) B POSITIVE     Antibody screen NEG    POC LACTIC ACID    Collection Time: 09/15/19 11:34 AM   Result Value Ref Range    Lactic Acid (POC) 1.46 0.5 - 1.9 mmol/L   GLUCOSE, POC    Collection Time: 09/15/19  3:53 PM   Result Value Ref Range    Glucose (POC) 120 (H) 65 - 100 mg/dL   LACTIC ACID    Collection Time: 09/15/19  3:55 PM   Result Value Ref Range    Lactic acid 1.1 0.4 - 2.0 MMOL/L   GLUCOSE, POC    Collection Time: 09/15/19  8:09 PM   Result Value Ref Range    Glucose (POC) 183 (H) 65 - 996 mg/dL   METABOLIC PANEL, COMPREHENSIVE    Collection Time: 09/16/19  6:33 AM   Result Value Ref Range    Sodium 146 (H) 136 - 145 mmol/L    Potassium 3.9 3.5 - 5.1 mmol/L    Chloride 110 (H) 98 - 107 mmol/L    CO2 31 21 - 32 mmol/L    Anion gap 5 (L) 7 - 16 mmol/L    Glucose 154 (H) 65 - 100 mg/dL    BUN 26 (H) 8 - 23 MG/DL    Creatinine 1.36 0.8 - 1.5 MG/DL    GFR est AA >60 >60 ml/min/1.73m2    GFR est non-AA 54 (L) >60 ml/min/1.73m2    Calcium 9.3 8.3 - 10.4 MG/DL    Bilirubin, total 0.5 0.2 - 1.1 MG/DL    ALT (SGPT) 13 12 - 65 U/L    AST (SGOT) 13 (L) 15 - 37 U/L    Alk. phosphatase 60 50 - 136 U/L    Protein, total 6.4 6.3 - 8.2 g/dL    Albumin 2.6 (L) 3.2 - 4.6 g/dL    Globulin 3.8 (H) 2.3 - 3.5 g/dL    A-G Ratio 0.7 (L) 1.2 - 3.5     CBC WITH AUTOMATED DIFF    Collection Time: 09/16/19  6:33 AM   Result Value Ref Range    WBC 12.6 (H) 4.3 - 11.1 K/uL    RBC 3.82 (L) 4.23 - 5.6 M/uL    HGB 10.8 (L) 13.6 - 17.2 g/dL    HCT 35.7 (L) 41.1 - 50.3 %    MCV 93.5 79.6 - 97.8 FL    MCH 28.3 26.1 - 32.9 PG    MCHC 30.3 (L) 31.4 - 35.0 g/dL    RDW 18.8 (H) 11.9 - 14.6 %    PLATELET 731 510 - 834 K/uL    MPV 10.4 9.4 - 12.3 FL    ABSOLUTE NRBC 0.00 0.0 - 0.2 K/uL    DF AUTOMATED      NEUTROPHILS 80 (H) 43 - 78 %    LYMPHOCYTES 9 (L) 13 - 44 %    MONOCYTES 11 4.0 - 12.0 %    EOSINOPHILS 0 (L) 0.5 - 7.8 %    BASOPHILS 0 0.0 - 2.0 %    IMMATURE GRANULOCYTES 1 0.0 - 5.0 %    ABS. NEUTROPHILS 10.1 (H) 1.7 - 8.2 K/UL    ABS. LYMPHOCYTES 1.1 0.5 - 4.6 K/UL    ABS. MONOCYTES 1.4 (H) 0.1 - 1.3 K/UL    ABS. EOSINOPHILS 0.0 0.0 - 0.8 K/UL    ABS. BASOPHILS 0.0 0.0 - 0.2 K/UL    ABS. IMM.  GRANS. 0.1 0.0 - 0.5 K/UL   TROPONIN I    Collection Time: 09/16/19  6:33 AM   Result Value Ref Range    Troponin-I, Qt. 0.07 (H) 0.02 - 0.05 NG/ML   GLUCOSE, POC    Collection Time: 09/16/19  7:05 AM   Result Value Ref Range    Glucose (POC) 168 (H) 65 - 100 mg/dL        All Micro Results     Procedure Component Value Units Date/Time    CULTURE, URINE [[id number]]  (Abnormal) Collected:  09/15/19 1121    Order Status:  Completed Specimen:  Urine from Clean catch Updated:  09/16/19 0800     Special Requests: NO SPECIAL REQUESTS        Culture result:       >100,000 COLONIES/mL GRAM NEGATIVE RODS IDENTIFICATION AND SUSCEPTIBILITY TO FOLLOW          CULTURE, BLOOD [[id number]] Collected:  09/15/19 1538    Order Status:  Completed Specimen:  Blood Updated:  09/15/19 1756    CULTURE, BLOOD [[id number]] Collected:  09/15/19 1555    Order Status:  Completed Specimen:  Blood Updated:  09/15/19 9951    CULTURE, URINE [[id number]]     Order Status:  Canceled Specimen:  Urine from Clean catch           Current Meds:  Current Facility-Administered Medications   Medication Dose Route Frequency    Lactobacillus Acidoph & Bulgar (FLORANEX) tablet 2 Tab  2 Tab Oral BID    sodium chloride (NS) flush 5-40 mL  5-40 mL IntraVENous Q8H    sodium chloride (NS) flush 5-40 mL  5-40 mL IntraVENous PRN    albuterol (PROVENTIL VENTOLIN) nebulizer solution 2.5 mg  2.5 mg Nebulization Q6H RT    brimonidine (ALPHAGAN P) 0.1 % ophthalmic solution 1 Drop (Patient Supplied)  1 Drop Right Eye BID    apixaban (ELIQUIS) tablet 5 mg (Patient Supplied)  5 mg Oral BID    aspirin delayed-release tablet 81 mg  81 mg Oral DAILY    atorvastatin (LIPITOR) tablet 40 mg (Patient Supplied)  40 mg Oral QHS    captopril (CAPOTEN) tablet 50 mg (Patient Supplied)  50 mg Oral TID    cycloSPORINE modified (GENGRAF, NEORAL) capsule 25 mg (Patient Supplied)  25 mg Oral DAILY    dilTIAZem CD (CARDIZEM CD) capsule 180 mg (Patient Supplied)  180 mg Oral DAILY    dorzolamide-timolol (COSOPT) 22.3-6.8 mg/mL ophthalmic solution 1 Drop (Patient Supplied)  1 Drop Both Eyes BID    ferrous sulfate tablet 325 mg  325 mg Oral DAILY    hydrALAZINE (APRESOLINE) tablet 50 mg (Patient Supplied)  50 mg Oral BID    [Held by provider] insulin glargine (LANTUS) injection 45 Units  45 Units SubCUTAneous BID    [Held by provider] insulin lispro (HUMALOG) injection 12 Units  12 Units SubCUTAneous ACB    [Held by provider] insulin lispro (HUMALOG) injection 22 Units  22 Units SubCUTAneous ACL    [Held by provider] insulin lispro (HUMALOG) injection 28 Units  28 Units SubCUTAneous ACD    mycophenolic acid (MYFORTIC) 166 mg tab, delayed release (Patient Supplied)  180 mg Oral BID    predniSONE (DELTASONE) tablet 10 mg (Patient Supplied)  10 mg Oral DAILY WITH BREAKFAST    sotalol (BETAPACE) tablet 120 mg (Patient Supplied)  120 mg Oral Q12H    guaiFENesin ER (MUCINEX) tablet 600 mg  600 mg Oral Q12H    sodium chloride (NS) flush 5-40 mL  5-40 mL IntraVENous Q8H    sodium chloride (NS) flush 5-40 mL  5-40 mL IntraVENous PRN    cefTRIAXone (ROCEPHIN) 2 g in 0.9% sodium chloride (MBP/ADV) 50 mL  2 g IntraVENous Q24H    acetaminophen (TYLENOL) tablet 650 mg  650 mg Oral Q4H PRN    furosemide (LASIX) injection 60 mg  60 mg IntraVENous BID    insulin lispro (HUMALOG) injection   SubCUTAneous AC&HS    ondansetron (ZOFRAN) injection 4 mg  4 mg IntraVENous Q6H PRN    budesonide (PULMICORT) 500 mcg/2 ml nebulizer suspension  500 mcg Nebulization BID RT    influenza vaccine 2019-20 (6 mos+)(PF) (FLUARIX/FLULAVAL/FLUZONE QUAD) injection 0.5 mL  0.5 mL IntraMUSCular PRIOR TO DISCHARGE    donepezil (ARICEPT) tablet 10 mg (Patient Supplied)  10 mg Oral QHS    cycloSPORINE modified (GENGRAF) capsule 100 mg (Patient Supplied)  100 mg Oral BID       Diet:  DIET DIABETIC CONSISTENT CARB    Other Studies (last 24 hours):  Xr Chest Sngl V    Result Date: 9/16/2019  EXAM: TEMPORARY INDICATION: Respiratory failure COMPARISON: 9/15/2019 FINDINGS: A portable AP radiograph of the chest was obtained at 0514 hours. The patient is on a cardiac monitor. Bilateral airspace disease worse in the interval. New bilateral pleural effusions. The cardiac and mediastinal contours and pulmonary vascularity are normal.  The bones and soft tissues are grossly within normal limits. IMPRESSION: Pulmonary edema worse in the interval with new bilateral pleural effusions. Xr Chest Sngl V    Result Date: 9/15/2019  EXAMINATION: CHEST RADIOGRAPH 9/15/2019 11:19 AM ACCESSION NUMBER: [id number] INDICATION: Shortness of breath, hypoxia COMPARISON: Chest x-ray 6/4/2019 TECHNIQUE: A single AP view of the chest was obtained. FINDINGS: Support Lines and Tubes: None Cardiac Silhouette: Unchanged pronounced cardiomegaly. Lungs: Mild interstitial edema. Pleura: Small left pleural effusion. No pneumothorax. Osseous Structures: Prior median sternotomy. Upper Abdomen: Unremarkable. IMPRESSION: 1. Unchanged pronounced cardiomegaly. 2. Mild interstitial edema. 3. Unchanged small left pleural effusion. VOICE DICTATED BY: Dr. Leal Self    Result Date: 9/15/2019  CT ABDOMEN AND PELVIS INDICATION:  Hematuria, UTI Multiple axial images were obtained through the abdomen and pelvis without intravenous or oral contrast.  Radiation dose reduction techniques were used for this study: All CT scans performed at this facility use one or all of the following: Automated exposure control, adjustment of the mA and/or kVp according to patient's size, iterative reconstruction. COMPARISON: 02/13/2017 FINDINGS: -KIDNEYS/URETERS: The native kidneys are atrophic. There are vascular calcifications, no hydronephrosis and no significant renal mass. There is also a right lower quadrant renal transplant. There is no definite kidney stone. There is no hydronephrosis. -BLADDER: Normal. -LUNG BASES: Small bilateral pleural effusions and bibasilar infiltrate/atelectasis -LIVER: Normal in size and appearance. -GALLBLADDER/BILE DUCTS: No gallstones or bile duct dilatation. -PANCREAS: Normal. -SPLEEN: Normal. -ADRENALS: Normal. -REPRODUCTIVE ORGANS: No pelvic masses. -BOWEL: Normal caliber. No inflammatory changes. The appendix is normal in size. -LYMPH NODES: No significant retroperitoneal, mesenteric, or pelvic adenopathy. -BONES: No fracture or significant bone lesion. -VASCULATURE: Normal -OTHER: No ascites. Small umbilical hernia. IMPRESSION: 1.  Stable appearance of the right lower quadrant renal transplant. No evidence of obstruction.  2.  Bibasilar infiltrate/atelectasis and bilateral pleural effusions       Assessment and Plan:     Hospital Problems as of 9/16/2019 Date Reviewed: 8/20/2019          Codes Class Noted - Resolved POA    * (Principal) UTI (urinary tract infection) ICD-10-CM: N39.0  ICD-9-CM: 599.0  9/15/2019 - Present Unknown        Acute respiratory failure with hypoxia (HCC) ICD-10-CM: J96.01  ICD-9-CM: 518.81  9/15/2019 - Present Unknown        Diabetic ulcer of toe of right foot associated with type 2 diabetes mellitus ([facility] ICD-10-CM: E11.621, L97.519  ICD-9-CM: 250.80, 707.15  3/15/2019 - Present Yes        Iron deficiency anemia ICD-10-CM: D50.9  ICD-9-CM: 280.9  12/19/2018 - Present Yes        Obesity, morbid ([facility] ICD-10-CM: E66.01  ICD-9-CM: 278.01  12/19/2018 - Present Yes        Chronic diastolic heart failure (HCC) (Chronic) ICD-10-CM: I50.32  ICD-9-CM: 428.32  Unknown - Present Yes        Atrial fibrillation (HCC) (Chronic) ICD-10-CM: I48.91  ICD-9-CM: 427.31  12/9/2015 - Present Yes        ILD (interstitial lung disease) ([facility] (Chronic) ICD-10-CM: J84.9  ICD-9-CM: 436  8/27/2015 - Present Yes        Hx of BKA ([facility] ICD-10-CM: Z89.519  ICD-9-CM: V49.75  6/16/2015 - Present Yes        DM (diabetes mellitus) ([facility] ICD-10-CM: E11.9  ICD-9-CM: 250.00  6/16/2015 - Present Yes        Glaucoma (Chronic) ICD-10-CM: H40.9  ICD-9-CM: 365.9  12/29/2009 - Present Yes        Hypertension ICD-10-CM: I10  ICD-9-CM: 401.9  12/29/2009 - Present Yes    Overview Addendum 4/5/2019  3:43 PM by Olga Lidia Macdonald LPN     Last Assessment & Plan:   Controlled on current regimen    Last Assessment & Plan:   Controlled on current regimen. ALDEN (Obstructive Sleep Apnea), uses home CPAP (Chronic) ICD-10-CM: G47.33  ICD-9-CM: 327.23  12/29/2009 - Present Yes              A/P:       Complicated UTI in the setting of renal transplant  Start 2 gm Rocephin IV  Send urine cx  Send BC X2  Obtain CT urogram with hematuria shows stable renal transplant, no other abnormalities, UTI, hx renal transplant     Diastolic HF:  Last Echo 3/62/8990, is followed by Dr. Andrew Suarez, per note, echo noted to be normal.  On room air today, saturations mid to high 90s.   Lasix 60 mg IV BID  Strict I/Os  CXR with mild interstitial edema, no signs of infections  Rocephin 2 gm will cover for pneumonia and UTI  Repeat CXR shows worsening bilateral pleural effusions. Followed by [facility] and [facility] outpatient  Previous BNP June 2019 results 496     Afib  Continue eliquis  Continue sotalol, cardizem     DM II  Check A1C  Continue home regimen verified with wife of Lantus 45 units BID, humalog 12 units before breakfast, 22 units before lunch, 28 units before dinner     Renal cell carcinoma s/p renal transplant  In the setting of acute infection would not typically continue anti-rejection meds, pt does not appear septic, wife request meds continued.   If pt becomes clinically septic will need to stop anti-rejection meds  Followed by Dr. Christie Nugent, Nephrology   On prednisone, continue     ILD  Scheduled albuterol nebs  CXR in AM  Last Saw Dr. Kirk Abreu, Pulmonary approx 2 weeks ago      Hypernatremia  Likely secondary to fluid overload  Lasix BID  BMP in AM     HTN  Continue current regimen     ALDEN  Use home CPAP with naps and at night     Iron deficiency anemia  Continue home supplementation     Dry gangrene right great and 2nd toe  Consult wound care  Does not appear infected         Signed:  Evi Temple, MELINA

## 2019-09-16 NOTE — PROGRESS NOTES
Hourly rounds done. Pt denies pain, nausea, vomiting. Pink-tinged mucous from L eye, MD aware. Cardiac monitor running NSR. Remains on 2 L O2. CPAP thru night. Wife at bedside. All needs met at this time.

## 2019-09-16 NOTE — PROGRESS NOTES
Per therapy, patient could benefit from SNF rehab. Family would like referrals sent to [place] rehab and [facility]. Referrals sent. Awaiting response.

## 2019-09-17 NOTE — PROGRESS NOTES
Per wife request, went to room to re-eval patient. Wife stated when she returned from lunch, patient was \"sleeping hard\"; nursing was able to arouse easily but saturations 85% (HOB down by wife). Patient received therapy today and sat up on side of bed, patient stated being tired from this. Also has sleep apnea (wears CPap at night), morbid obese, and HOB flat related to wife continues to place patient head in this position. Nursing placed patient on 1 liter oxygen, patient remained alert and oriented x3, saturations up to 92%. Blood sugar 260. HOB elevated 45 degrees and discussed with spouse about keeping HOB elevated to help with breathing. No congestion, no tachypnea.

## 2019-09-17 NOTE — PROGRESS NOTES
Hourly rounds completed this shift. All needs met. Bed low/locked. No c/o pain. Family at bedside. Call light in reach. Will continue to monitor pt and give report to oncoming RN.      Peripheral IV 09/15/19 Right Antecubital (Active)   Site Assessment Clean, dry, & intact 9/17/2019  3:27 PM   Phlebitis Assessment 0 9/17/2019  3:27 PM   Infiltration Assessment 0 9/17/2019  3:27 PM   Dressing Status Clean, dry, & intact 9/17/2019  3:27 PM   Dressing Type Transparent;Tape 9/17/2019  3:27 PM   Hub Color/Line Status Pink;Flushed;Patent 9/17/2019  3:27 PM   Alcohol Cap Used No 9/15/2019  2:30 PM

## 2019-09-17 NOTE — PROGRESS NOTES
Problem: Mobility Impaired (Adult and Pediatric)  Goal: *Acute Goals and Plan of Care (Insert Text)  Description  LTG:  (1.)Mr. Wellington Echols will move from supine to sit and sit to supine , scoot up and down and roll side to side with MINIMAL ASSIST within 7 treatment day(s). Goal met 9/17/19  (2.)Mr. Wellington Echols will perform supine and/or seated exercises and functional activities for 10+ minutes to improve strength and mobility within 7 days. (3.)Mr. Wellington Echols will transfer from bed to chair and chair to bed with MAXIMAL ASSIST using the least restrictive device within 7 treatment day(s). (4.)Mr. Wellington Echols will ambulate with MAXIMAL ASSIST for 5+ feet using least restrictive device within 7 days. ________________________________________________________________________________________________   Outcome: Progressing Towards Goal     PHYSICAL THERAPY: Daily Note and PM 9/17/2019  INPATIENT: PT Visit Days : 2  [place] / [place] / Product Type: Medicare /       NAME/AGE/GENDER: Donta Hull is a 67 y.o. male   PRIMARY DIAGNOSIS: UTI (urinary tract infection) [N39.0] UTI (urinary tract infection)   UTI (urinary tract infection)         ICD-10: Treatment Diagnosis:    · Generalized Muscle Weakness (M62.81)  · Difficulty in walking, Not elsewhere classified (R26.2)  · Other abnormalities of gait and mobility (R26.89)   Precaution/Allergies:  Chlorhexidine      ASSESSMENT:     Mr. Wellington Echols is a 67year old male admitted from home for UTI, hypoxia. 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 Wife reports that he is typically ambulatory for household distances using rolling walker. 9/17/19: Pt presents in supine and agreeable to therapy. Bed mobility is modified independent and with additional time for task completion.   Scooting to edge of bed modified independent. Sitting balance is good. Patient is able to position himself edge of bed. Participates in a few LE exercises. After sitting for about 15 minutes the patient is returned to supine in bed modified independent again with additional time. Patient is left supine in the bed and positioned for comfort with needs within reach. Good session. Patient is scheduled to discharge to rehab upon approval.  Patient is making progress towards goals. Butch Glass appears to be functioning well below baseline with strength and mobility at this time; seems very weak with decreased balance and poor activity tolerance. Needs rehab at discharge. Will continue PT efforts. This section established at most recent assessment   PROBLEM LIST (Impairments causing functional limitations):  1. Decreased Strength  2. Decreased Transfer Abilities  3. Decreased Ambulation Ability/Technique  4. Decreased Balance  5. Decreased Activity Tolerance  6. Decreased Pacing Skills  7. Increased Shortness of Breath  8. Decreased Cognition   INTERVENTIONS PLANNED: (Benefits and precautions of physical therapy have been discussed with the patient.)  1. Balance Exercise  2. Bed Mobility  3. Gait Training  4. Home Exercise Program (HEP)  5. Therapeutic Activites  6. Therapeutic Exercise/Strengthening  7. Transfer Training     TREATMENT PLAN: Frequency/Duration: 3 times a week for duration of hospital stay  Rehabilitation Potential For Stated Goals: [facility] (at time of discharge pending progress):    Placement: It is my opinion, based on this patient's performance to date, that Mr. Ham Segura may benefit from participating in 1-2 additional therapy sessions in order to continue to assess for rehab potential and then make recommendation for disposition at discharge. Currently seems pt needs rehab.   Equipment:    TBD pending progress              HISTORY:   History of Present Injury/Illness (Reason for Referral):  Per H&P, \"Mr. Pamela Moses is a 66 yo male with PMH of afib on eliquis, HTN, ALDEN on CPAP, diastolic CHF last EF 35-24% Aug 2019, ILD followed by [facility], renal cell carcinoma s/p renal transplant left, dry gangrene currently being tx by outpatient wound care right great toe and second toe who presented with c/o fever, urinary incontinence, dysuria. Also c/o cough which he has been seen for last week. Cough is productive with white sputum with red streaks. UA +UTI. CXR with mild interstitial edema, cardiomegaly, unchanged small left pleural effusion. Pt found to be hypoxic with RA sats 87-88%, improved with 2 L O2 via NC. Pt drowsy, wife states he did not sleep last night. Pt denies CP, SOB, sick contact, abd pain. \"    Past Medical History/Comorbidities:   Mr. Pamela Moses  has a past medical history of Abnormal cardiovascular function study, Abnormal EKG, Allergic rhinitis (8/27/2015), Anemia, ARF (acute renal failure) ([address] (3/23/2010), Atrial fibrillation ([address] (12/9/2015), Atrial fibrillation with rapid ventricular response ([address] (1/23/2014), CAD (Coronary Artery Disease), Native Coronary Artery, CABG x4 (12/29/2009), Cancer ([address] Chest pain, Chronic constipation, Chronic diastolic heart failure ([address] Chronic kidney disease, Cough, Diabetes mellitus with background retinopathy ([address] Diabetes Mellitus, insulin dependent (12/29/2009), Dyspnea on exertion, Erectile dysfunction/impotence, Gastroenteritis, acute (3/23/2010), Gastroesophageal reflux (12/9/2015), Glaucoma (12/29/2009), HTN (hypertension) (12/29/2009), Hyperlipidemia (12/9/2015), ILD (interstitial lung disease) ([address] (8/27/2015), Long-term current use of steroids (3/18/2015), Morbid obesity with body mass index of 45.0-49.9 in adult [facility]) (1/21/2014), Need for prophylactic antibiotic (3/18/2015), ALDEN (Obstructive Sleep Apnea), uses home CPAP (12/29/2009), Paroxysmal SVT/rapid atrial  (supraventricular tachycardia) ([facility] [facility] Personal history of tobacco use (3/18/2015), Pneumonia, Pneumonia, organism unspecified(486) (12/30/2009), Premature ventricular contraction, Reflux esophagitis - (GERD), Renal cancer ([facility] (9/13/2010), Restrictive lung disease (3/18/2015), Retinopathy (12/9/2015), S/P CABG (coronary artery bypass graft), Status post kidney transplant (12/29/2009), Transplant, kidney (6/12/96), Transplanted kidney, and URI (upper respiratory infection). He also has no past medical history of Stroke [facility]). Mr. Jimbo Portillo  has a past surgical history that includes hx cataract removal (1995, 2005); hx retinal detachment repair (1988, 1989); hx vascular access (1995); pr cabg, artery-vein, four (2002); pr transplant kidney+recip nephrec (6/12/1996); hx urological; and hx amputation (2005). Social History/Living Environment:   Home Environment: Private residence  # Steps to Enter: 3  Rails to Enter: Yes  Hand Rails : Right  Wheelchair Ramp: No  One/Two Story Residence: One story  Living Alone: No  Support Systems: Spouse/Significant Other/Partner  Patient Expects to be Discharged to[de-identified] Unknown  Current DME Used/Available at Home: Cane, straight, Raised toilet seat, Safety frame toliet, Walker, rolling, Wheelchair  Prior Level of Function/Work/Activity:  Lives with wife. Mod I with RW around house and uses L LE prosthesis. No recent falls. Number of Personal Factors/Comorbidities that affect the Plan of Care: 1-2: MODERATE COMPLEXITY   EXAMINATION:   Most Recent Physical Functioning:   Gross Assessment:                  Posture:     Balance:  Sitting: Intact  Sitting - Static: Good (unsupported)  Sitting - Dynamic: Fair (occasional)  Standing: (n/a) Bed Mobility:  Rolling: Modified independent  Supine to Sit: Modified independent  Sit to Supine: Modified independent  Scooting: Modified independent  Wheelchair Mobility:     Transfers:     Gait:            Body Structures Involved:  1. Muscles Body Functions Affected:  1. Mental  2.  Movement Related Activities and Participation Affected:  1. General Tasks and Demands  2. Mobility  3. Domestic Life  4. Community, Social and [place]   Number of elements that affect the Plan of Care: 4+: HIGH COMPLEXITY   CLINICAL PRESENTATION:   Presentation: Evolving clinical presentation with changing clinical characteristics: MODERATE COMPLEXITY   CLINICAL DECISION MAKIN [facility] Mobility Inpatient Short Form  How much difficulty does the patient currently have. .. Unable A Lot A Little None   1. Turning over in bed (including adjusting bedclothes, sheets and blankets)? ? 1   ? 2   ? 3   ? 4   2. Sitting down on and standing up from a chair with arms ( e.g., wheelchair, bedside commode, etc.)   ? 1   ? 2   ? 3   ? 4   3. Moving from lying on back to sitting on the side of the bed?   ? 1   ? 2   ? 3   ? 4   How much help from another person does the patient currently need. .. Total A Lot A Little None   4. Moving to and from a bed to a chair (including a wheelchair)? ? 1   ? 2   ? 3   ? 4   5. Need to walk in hospital room? ? 1   ? 2   ? 3   ? 4   6. Climbing 3-5 steps with a railing? ? 1   ? 2   ? 3   ? 4   © , Trustees of [address] [place] [address], under license to [employer]. All rights reserved      Score:  Initial: 8 Most Recent: X (Date: -- )    Interpretation of Tool:  Represents activities that are increasingly more difficult (i.e. Bed mobility, Transfers, Gait). Medical Necessity:     · Patient demonstrates fair  ·  rehab potential due to higher previous functional level. Reason for Services/Other Comments:  · Patient continues to demonstrate capacity to improve strength, balance, activity tolerance which will increase independence, decrease amount of assistance required from caregiver and increase safety  · .    Use of outcome tool(s) and clinical judgement create a POC that gives a: Questionable prediction of patient's progress: MODERATE COMPLEXITY TREATMENT:   (In addition to Assessment/Re-Assessment sessions the following treatments were rendered)   Pre-treatment Symptoms/Complaints: \"Hello\"  Pain: Initial:   Pain Intensity 1: 0  Post Session:  0/10     Therapeutic Activity: (    23 minutes): Therapeutic activities including Bed mobility (rolling, scooting, supine<->sit), seated balance and functional activities, attempts at repositioning mobility, strength, balance and activity tolerance . Patient is modified independent but requires additional time  and cueing (manual, tactile, verbal)   to promote static and dynamic balance in standing and promote motor control of bilateral, lower extremity(s). Braces/Orthotics/Lines/Etc:   · O2 Device: Room air  Treatment/Session Assessment:    · Response to Treatment:  Bed mobility is modified independent and requires additional time for completion. · Interdisciplinary Collaboration:   o Physical Therapy Assistant  o Registered Nurse  · After treatment position/precautions:   o Supine in bed  o Bed/Chair-wheels locked  o Bed in low position  o Call light within reach  o RN notified   · Compliance with Program/Exercises: Will assess as treatment progresses  · Recommendations/Intent for next treatment session: \"Next visit will focus on advancements to more challenging activities and reduction in assistance provided\".   Total Treatment Duration:  PT Patient Time In/Time Out  Time In: 1325  Time Out: [address] [facility]

## 2019-09-17 NOTE — PROGRESS NOTES
Pt wife c/o pt has increased sleepiness & confusion. Upon assessment pt HOB at 25 degrees. Awoke pt without difficulty. Pt alert/oriented x4. Vitals obtained O2 sat 85%. Pt placed on 1L O2 via NC. Sats quickly luis to 92%. Melisa Austin NP notified- to assess pt.

## 2019-09-17 NOTE — PROGRESS NOTES
Hospitalist Progress Note     Admit Date:  9/15/2019 10:52 AM   Name:  Ariel Cee   Age:  67 y.o.  :  1940   MRN:  [id number]   PCP:  Graciela Meyer MD  Treatment Team: Attending Provider: Karen Shaikh, Gita Hall MD; Utilization Review: Violeta Rey RN; Care Manager: Juvencio Moffett RN; Physical Therapy Assistant: Asael Persaud PTA    Subjective:   HPI and or CC:  Mr. Lolis Perez is a 66 yo male with PMH of afib on eliquis, HTN, ALDEN on CPAP, diastolic CHF last EF 33-67% Aug 2019, ILD followed by [facility], renal cell carcinoma s/p renal transplant left, dry gangrene currently being tx by outpatient wound care right great toe and second toe who presented with c/o fever, urinary incontinence, dysuria. Also c/o cough which he has been seen for last week. Cough is productive with white sputum with red streaks. UA +UTI. CXR with mild interstitial edema, cardiomegaly, unchanged small left pleural effusion. Pt found to be hypoxic with RA sats 87-88%, improved with 2 L O2 via NC. Pt drowsy, wife states he did not sleep last night. Pt denies CP, SOB, sick contact, abd pain. : Patient alert and oriented x3. Afebrile, WBC normal. Repeat chest x ray today clear, slight increase in creatinine today so stopping IV Lasix and resuming oral home dose of 80 mg bid per wife. No hypoxia or tachypnea noted. Blood sugars more elevated so resuming home insulin dose. Urine culture with E Coli with susceptibility to Ceftriaxone-will change to oral Keflex. Patient accepted to [facility] and can discharge tomorrow if remains stable.        Objective:     Patient Vitals for the past 24 hrs:   Temp Pulse Resp BP SpO2   19 0734     92 %   19 0634 98.6 °F (37 °C) 63 18 132/59 90 %   19 0341 97.7 °F (36.5 °C) (!) 57 18 124/50 94 %   19 2321 98.3 °F (36.8 °C) 63 18 134/72 95 %   09/16/19 1942     91 %   19 98.5 °F (36.9 °C) 63 18 133/53 93 %   09/16/19 1601 98.6 °F (37 °C) 63 20 143/70 (!) 86 %   09/16/19 1524     (!) 89 %   09/16/19 1102 97.9 °F (36.6 °C) 69 20 134/62 90 %     Oxygen Therapy  O2 Sat (%): 92 % (09/17/19 0734)  Pulse via Oximetry: 63 beats per minute (09/17/19 0734)  O2 Device: Room air (09/17/19 0734)  O2 Flow Rate (L/min): 2 l/min (09/16/19 0137)  FIO2 (%): 21 % (09/16/19 1942)    Intake/Output Summary (Last 24 hours) at 9/17/2019 1055  Last data filed at 9/17/2019 0842  Gross per 24 hour   Intake 600 ml   Output    Net 600 ml         REVIEW OF SYSTEMS: Comprehensive ROS performed and negative except as stated in HPI. Physical Examination:  General:       Drowsy, cooperative, obese. Head:            Normocephalic, without obvious abnormality, atraumatic. Nose:            Nares normal. No drainage or sinus tenderness. Lungs:          CTA, resp even and nonlabored  Heart:            S1S2 present without murmurs rubs gallops. RRR. 2+ RLE edema. Left BKA. Abdomen:    Soft, non-tender. obese. Bowel sounds normal. No   Extremities: No cyanosis. Left BKA with prosthesis. Right great toe with large area of necrosis covered in betadine, right 2nd dose with small area of necrosis  Skin:             Texture, turgor normal. No rashes or lesions. Not Jaundiced  Neurologic:  Drowsy, oriented X3. No focal deficits       Data Review:  I have reviewed all labs, meds, telemetry events, and studies from the last 24 hours.     Recent Results (from the past 24 hour(s))   GLUCOSE, POC    Collection Time: 09/16/19 11:05 AM   Result Value Ref Range    Glucose (POC) 240 (H) 65 - 100 mg/dL   CBC WITH AUTOMATED DIFF    Collection Time: 09/16/19  1:14 PM   Result Value Ref Range    WBC 11.5 (H) 4.3 - 11.1 K/uL    RBC 3.88 (L) 4.23 - 5.6 M/uL    HGB 11.0 (L) 13.6 - 17.2 g/dL    HCT 36.5 (L) 41.1 - 50.3 %    MCV 94.1 79.6 - 97.8 FL    MCH 28.4 26.1 - 32.9 PG    MCHC 30.1 (L) 31.4 - 35.0 g/dL    RDW 19.2 (H) 11.9 - 14.6 %    PLATELET 233 150 - 450 K/uL    MPV 10.9 9.4 - 12.3 FL    ABSOLUTE NRBC 0.00 0.0 - 0.2 K/uL    DF AUTOMATED      NEUTROPHILS 84 (H) 43 - 78 %    LYMPHOCYTES 5 (L) 13 - 44 %    MONOCYTES 10 4.0 - 12.0 %    EOSINOPHILS 0 (L) 0.5 - 7.8 %    BASOPHILS 0 0.0 - 2.0 %    IMMATURE GRANULOCYTES 0 0.0 - 5.0 %    ABS. NEUTROPHILS 9.7 (H) 1.7 - 8.2 K/UL    ABS. LYMPHOCYTES 0.6 0.5 - 4.6 K/UL    ABS. MONOCYTES 1.1 0.1 - 1.3 K/UL    ABS. EOSINOPHILS 0.0 0.0 - 0.8 K/UL    ABS. BASOPHILS 0.0 0.0 - 0.2 K/UL    ABS. IMM. GRANS. 0.0 0.0 - 0.5 K/UL   GLUCOSE, POC    Collection Time: 09/16/19  4:05 PM   Result Value Ref Range    Glucose (POC) 312 (H) 65 - 100 mg/dL   GLUCOSE, POC    Collection Time: 09/16/19  8:56 PM   Result Value Ref Range    Glucose (POC) 306 (H) 65 - 100 mg/dL   CBC WITH AUTOMATED DIFF    Collection Time: 09/17/19  6:36 AM   Result Value Ref Range    WBC 9.9 4.3 - 11.1 K/uL    RBC 3.84 (L) 4.23 - 5.6 M/uL    HGB 10.7 (L) 13.6 - 17.2 g/dL    HCT 35.4 (L) 41.1 - 50.3 %    MCV 92.2 79.6 - 97.8 FL    MCH 27.9 26.1 - 32.9 PG    MCHC 30.2 (L) 31.4 - 35.0 g/dL    RDW 18.8 (H) 11.9 - 14.6 %    PLATELET 057 300 - 233 K/uL    MPV 10.4 9.4 - 12.3 FL    ABSOLUTE NRBC 0.00 0.0 - 0.2 K/uL    DF AUTOMATED      NEUTROPHILS 75 43 - 78 %    LYMPHOCYTES 13 13 - 44 %    MONOCYTES 11 4.0 - 12.0 %    EOSINOPHILS 1 0.5 - 7.8 %    BASOPHILS 0 0.0 - 2.0 %    IMMATURE GRANULOCYTES 0 0.0 - 5.0 %    ABS. NEUTROPHILS 7.4 1.7 - 8.2 K/UL    ABS. LYMPHOCYTES 1.2 0.5 - 4.6 K/UL    ABS. MONOCYTES 1.1 0.1 - 1.3 K/UL    ABS. EOSINOPHILS 0.1 0.0 - 0.8 K/UL    ABS. BASOPHILS 0.0 0.0 - 0.2 K/UL    ABS. IMM.  GRANS. 0.0 0.0 - 0.5 K/UL   METABOLIC PANEL, BASIC    Collection Time: 09/17/19  6:36 AM   Result Value Ref Range    Sodium 144 136 - 145 mmol/L    Potassium 3.7 3.5 - 5.1 mmol/L    Chloride 107 98 - 107 mmol/L    CO2 30 21 - 32 mmol/L    Anion gap 7 7 - 16 mmol/L    Glucose 236 (H) 65 - 100 mg/dL    BUN 32 (H) 8 - 23 MG/DL    Creatinine 1.62 (H) 0.8 - 1.5 MG/DL    GFR est AA 53 (L) >60 ml/min/1.73m2    GFR est non-AA 44 (L) >60 ml/min/1.73m2    Calcium 9.1 8.3 - 10.4 MG/DL   MAGNESIUM    Collection Time: 09/17/19  6:36 AM   Result Value Ref Range    Magnesium 2.3 1.8 - 2.4 mg/dL   GLUCOSE, POC    Collection Time: 09/17/19  7:10 AM   Result Value Ref Range    Glucose (POC) 242 (H) 65 - 100 mg/dL        All Micro Results     Procedure Component Value Units Date/Time    CULTURE, BLOOD [[id number]] Collected:  09/15/19 1538    Order Status:  Completed Specimen:  Blood Updated:  09/17/19 0838     Special Requests: --        LEFT  ARM       Culture result: NO GROWTH 2 DAYS       CULTURE, BLOOD [[id number]] Collected:  09/15/19 1555    Order Status:  Completed Specimen:  Blood Updated:  09/17/19 0838     Special Requests: --        RIGHT  ARM       Culture result: NO GROWTH 2 DAYS       CULTURE, URINE [[id number]]  (Abnormal)  (Susceptibility) Collected:  09/15/19 1121    Order Status:  Completed Specimen:  Urine from Clean catch Updated:  09/17/19 0708     Special Requests: NO SPECIAL REQUESTS        Culture result:       >100,000 COLONIES/mL ESCHERICHIA COLI          CULTURE, URINE [[id number]]     Order Status:  Canceled Specimen:  Urine from Clean catch           Current Meds:  Current Facility-Administered Medications   Medication Dose Route Frequency    [START ON 9/18/2019] esomeprazole (NEXIUM) capsule 40 mg (Patient Supplied)  40 mg Oral DAILY    cephALEXin (KEFLEX) capsule 500 mg  500 mg Oral Q6H    furosemide (LASIX) tablet 80 mg  80 mg Oral BID    Lactobacillus Acidoph & Bulgar (FLORANEX) tablet 2 Tab  2 Tab Oral BID    tuberculin injection 5 Units  5 Units IntraDERMal ONCE    sodium chloride (NS) flush 5-40 mL  5-40 mL IntraVENous Q8H    sodium chloride (NS) flush 5-40 mL  5-40 mL IntraVENous PRN    albuterol (PROVENTIL VENTOLIN) nebulizer solution 2.5 mg  2.5 mg Nebulization Q6H RT    brimonidine (ALPHAGAN P) 0.1 % ophthalmic solution 1 Drop (Patient Supplied)  1 Drop Right Eye BID    apixaban (ELIQUIS) tablet 5 mg (Patient Supplied)  5 mg Oral BID    aspirin delayed-release tablet 81 mg  81 mg Oral DAILY    atorvastatin (LIPITOR) tablet 40 mg (Patient Supplied)  40 mg Oral QHS    captopril (CAPOTEN) tablet 50 mg (Patient Supplied)  50 mg Oral TID    cycloSPORINE modified (GENGRAF, NEORAL) capsule 25 mg (Patient Supplied)  25 mg Oral DAILY    dilTIAZem CD (CARDIZEM CD) capsule 180 mg (Patient Supplied)  180 mg Oral DAILY    dorzolamide-timolol (COSOPT) 22.3-6.8 mg/mL ophthalmic solution 1 Drop (Patient Supplied)  1 Drop Both Eyes BID    ferrous sulfate tablet 325 mg  325 mg Oral DAILY    hydrALAZINE (APRESOLINE) tablet 50 mg (Patient Supplied)  50 mg Oral BID    insulin glargine (LANTUS) injection 45 Units  45 Units SubCUTAneous BID    insulin lispro (HUMALOG) injection 12 Units  12 Units SubCUTAneous ACB    insulin lispro (HUMALOG) injection 22 Units  22 Units SubCUTAneous ACL    insulin lispro (HUMALOG) injection 28 Units  28 Units SubCUTAneous ACD    mycophenolic acid (MYFORTIC) 917 mg tab, delayed release (Patient Supplied)  180 mg Oral BID    predniSONE (DELTASONE) tablet 10 mg (Patient Supplied)  10 mg Oral DAILY WITH BREAKFAST    sotalol (BETAPACE) tablet 120 mg (Patient Supplied)  120 mg Oral Q12H    guaiFENesin ER (MUCINEX) tablet 600 mg  600 mg Oral Q12H    sodium chloride (NS) flush 5-40 mL  5-40 mL IntraVENous Q8H    sodium chloride (NS) flush 5-40 mL  5-40 mL IntraVENous PRN    acetaminophen (TYLENOL) tablet 650 mg  650 mg Oral Q4H PRN    insulin lispro (HUMALOG) injection   SubCUTAneous AC&HS    ondansetron (ZOFRAN) injection 4 mg  4 mg IntraVENous Q6H PRN    budesonide (PULMICORT) 500 mcg/2 ml nebulizer suspension  500 mcg Nebulization BID RT    influenza vaccine 2019-20 (6 mos+)(PF) (FLUARIX/FLULAVAL/FLUZONE QUAD) injection 0.5 mL  0.5 mL IntraMUSCular PRIOR TO DISCHARGE    donepezil (ARICEPT) tablet 10 mg (Patient Supplied)  10 mg Oral QHS    cycloSPORINE modified (GENGRAF) capsule 100 mg (Patient Supplied)  100 mg Oral BID       Diet:  DIET DIABETIC CONSISTENT CARB    Other Studies (last 24 hours):  Xr Chest Sngl V    Result Date: 9/17/2019  EXAM: TEMPORARY INDICATION: Pulmonary edema COMPARISON: 9/16/2019 FINDINGS: A portable AP radiograph of the chest was obtained at 0507 hours. The patient is on a cardiac monitor. The lungs are clear. The cardiac and mediastinal contours and pulmonary vascularity are normal.  The bones and soft tissues are grossly within normal limits. IMPRESSION: The lungs are now clear.       Assessment and Plan:     Hospital Problems as of 9/17/2019 Date Reviewed: 8/20/2019          Codes Class Noted - Resolved POA    * (Principal) UTI (urinary tract infection) ICD-10-CM: N39.0  ICD-9-CM: 599.0  9/15/2019 - Present Unknown        Acute respiratory failure with hypoxia (HCC) ICD-10-CM: J96.01  ICD-9-CM: 518.81  9/15/2019 - Present Unknown        Diabetic ulcer of toe of right foot associated with type 2 diabetes mellitus ([facility] ICD-10-CM: E11.621, L97.519  ICD-9-CM: 250.80, 707.15  3/15/2019 - Present Yes        Iron deficiency anemia ICD-10-CM: D50.9  ICD-9-CM: 280.9  12/19/2018 - Present Yes        Obesity, morbid ([facility] ICD-10-CM: E66.01  ICD-9-CM: 278.01  12/19/2018 - Present Yes        Chronic diastolic heart failure (HCC) (Chronic) ICD-10-CM: I50.32  ICD-9-CM: 428.32  Unknown - Present Yes        Atrial fibrillation (HCC) (Chronic) ICD-10-CM: I48.91  ICD-9-CM: 427.31  12/9/2015 - Present Yes        ILD (interstitial lung disease) ([facility] (Chronic) ICD-10-CM: J84.9  ICD-9-CM: 530  8/27/2015 - Present Yes        Hx of BKA ([facility] ICD-10-CM: Z89.519  ICD-9-CM: V49.75  6/16/2015 - Present Yes        DM (diabetes mellitus) ([facility] ICD-10-CM: E11.9  ICD-9-CM: 250.00  6/16/2015 - Present Yes        Glaucoma (Chronic) ICD-10-CM: H40.9  ICD-9-CM: 365.9  12/29/2009 - Present Yes        Hypertension ICD-10-CM: I10  ICD-9-CM: 401.9  12/29/2009 - Present Yes    Overview Addendum 4/5/2019  3:43 PM by Josephine Easley LPN     Last Assessment & Plan:   Controlled on current regimen    Last Assessment & Plan:   Controlled on current regimen. ALDEN (Obstructive Sleep Apnea), uses home CPAP (Chronic) ICD-10-CM: G47.33  ICD-9-CM: 327.23  12/29/2009 - Present Yes              A/P:       Complicated UTI in the setting of renal transplant  Changing Rocephin to Keflex    urine cx-E Coli growth   BC X2-NGTD   CT urogram with hematuria shows stable renal transplant, no other abnormalities, UTI, hx renal transplant     Diastolic HF:  Last Echo 2/83/7298, is followed by Dr. Danita Collado, per note, echo noted to be normal.  On room air today, saturations mid to high 90s. Resuming home Lasix dose of 80 mg bid oral.  Strict I/Os  CXR with mild interstitial edema, no signs of infections initially but clear with repeat x ray today. Follow[facility] by [facility] and [facility] outpatient  Previous BNP June 2019 results 496; troponin levels elevated on admission and yesterday but patient does not complain of chest pain-EKG shows NSR with left ventricular hypertrophy with QRS widening-QTc 473.     Afib  Continue eliquis  Continue sotalol, cardizem     DM II  Check A1C  Continue home regimen verified with wife of Lantus 45 units BID, humalog 12 units before breakfast, 22 units before lunch, 28 units before dinner     Renal cell carcinoma s/p renal transplant  In the setting of acute infection would not typically continue anti-rejection meds, pt does not appear septic, wife request meds continued.   If pt becomes clinically septic will need to stop anti-rejection meds  Followed by Dr. Josette Mora, Nephrology   On prednisone, continue     ILD  Scheduled albuterol nebs  CXR in AM  Last Saw Dr. Yoni Velasco, Pulmonary approx 2 weeks ago      Hypernatremia  Likely secondary to fluid overload  Lasix BID  BMP in AM     HTN  Continue current regimen     ALDEN  Use home CPAP with naps and at night     Iron deficiency anemia  Continue home supplementation     Dry gangrene right great and 2nd toe  Consult wound care  Does not appear infected         Signed:  Jennifer Liner, STEPHENP

## 2019-09-17 NOTE — PROGRESS NOTES
Patient has been accepted to [facility]. Wife notified and has accepted bed offer. Patient can discharge to facility tomorrow when medically stable d/t needing a 3 midnight stay.

## 2019-09-17 NOTE — PROGRESS NOTES
Hourly rounds performed. All needs met. Bed is in low position and call light is within reach. Pt is resting quietly w/ no complaints. Will continue to monitor and report to oncoming nurse.

## 2019-09-17 NOTE — PROGRESS NOTES
Interdisciplinary Rounds completed 09/17/19. Nursing, Case Management, Physician and PT present. Plan of care reviewed and updated.     Will discharge in am.

## 2019-09-18 NOTE — PROGRESS NOTES
Pt is being discharged to [facility]. Hourly rounds completed throughout the shift. Pt denies needs at this time. Report called to Aixa at [facility]. Opportunity for questions provided. All needs met at this time.

## 2019-09-18 NOTE — PROGRESS NOTES
Hourly rounds performed. All needs met. Pt is resting quietly w/ no complaints at this time. Bed is in low position and call light is within reach. Will continue to monitor and report to oncoming nurse.

## 2019-09-18 NOTE — PROGRESS NOTES
Patient to discharge to [facility] to room 9D. Report line, [id number], given to RN. [employer] transportation setup for 3pm pickup d/t 48hr ppd being read. Family notified and in agreement. Care Management Interventions PCP Verified by CM: Yes Mode of Transport at Discharge: BLS Transition of Care Consult (CM Consult): Discharge Planning, SNF Discharge Durable Medical Equipment: No 
Physical Therapy Consult: Yes Occupational Therapy Consult: Yes Current Support Network: Lives with Spouse Confirm Follow Up Transport: Family Plan discussed with Pt/Family/Caregiver: Yes Freedom of Choice Offered: Yes Discharge Location Discharge Placement: Skilled nursing facility

## 2019-09-18 NOTE — PROGRESS NOTES
09/17/19 2308   Oxygen Therapy   O2 Sat (%) 95 %   Pulse via Oximetry 62 beats per minute   O2 Device CPAP nasal   FIO2 (%) 21 %   Respiratory   Respiratory (WDL) X   Respiratory Pattern Regular   Chest/Tracheal Assessment Chest expansion, symmetrical   CPAP/BIPAP   CPAP/BIPAP Start/Stop On   Device Mode CPAP   PIP Observed 4.7 cm H20   EPAP (cm H2O)   (home settings)   Total RR (Spontaneous) 16 breaths per minute   Pt's Home Machine Yes (type/vendor)   Biomedical Check Performed Yes   Settings Verified Yes   Pulmonary Toilet   Pulmonary Toilet H. O.B elevated        Pt. Placed on home CPAP per patient request. Will continue to monitor.

## 2019-09-18 NOTE — DISCHARGE SUMMARY
Discharge Summary   Patient ID:  Lauri Shankar  [id number]  27 y.o.  1940  Admit date: 9/15/2019 10:52 AM  Discharge date and time: 9/18/2019  Attending: Izabella Hall MD  PCP:  Brad Peralta MD  Treatment Team: Attending Provider: Izabella Hall MD; Utilization Review: Jose Enrique RN; Care Manager: Efrem Campo RN  Principal Diagnosis UTI (urinary tract infection)   Principal Problem:    UTI (urinary tract infection) (9/15/2019)    Active Problems:    Glaucoma (12/29/2009)      Hypertension (12/29/2009)      Overview: Last Assessment & Plan:       Controlled on current regimen            Last Assessment & Plan:       Controlled on current regimen.       ALDEN (Obstructive Sleep Apnea), uses home CPAP (12/29/2009)      ILD (interstitial lung disease) ([address] (8/27/2015)      Atrial fibrillation ([address] (12/9/2015)      Chronic diastolic heart failure (HCC) ()      Hx of BKA ([address] (6/16/2015)      Iron deficiency anemia (12/19/2018)      DM (diabetes mellitus) ([address] (6/16/2015)      Obesity, morbid ([address] (12/19/2018)      Diabetic ulcer of toe of right foot associated with type 2 diabetes mellitus ([address] (3/15/2019)      Acute respiratory failure with hypoxia ([address] (9/15/2019)       * Admission Diagnoses: UTI (urinary tract infection) [N39.0]  * Discharge Diagnoses:    Hospital Problems as of 9/18/2019 Date Reviewed: 8/20/2019          Codes Class Noted - Resolved POA    * (Principal) UTI (urinary tract infection) ICD-10-CM: N39.0  ICD-9-CM: 599.0  9/15/2019 - Present Unknown        Acute respiratory failure with hypoxia ([address] ICD-10-CM: J96.01  ICD-9-CM: 518.81  9/15/2019 - Present Unknown        Diabetic ulcer of toe of right foot associated with type 2 diabetes mellitus ([facility] ICD-10-CM: E11.621, G05.470  ICD-9-CM: 250.80, 707.15  3/15/2019 - Present Yes        Iron deficiency anemia ICD-10-CM: D50.9  ICD-9-CM: 280.9  12/19/2018 - Present Yes        Obesity, morbid ([address] ICD-10-CM: E66.01  ICD-9-CM: 278.01  12/19/2018 - Present Yes        Chronic diastolic heart failure (HCC) (Chronic) ICD-10-CM: I50.32  ICD-9-CM: 428.32  Unknown - Present Yes        Atrial fibrillation (HCC) (Chronic) ICD-10-CM: I48.91  ICD-9-CM: 427.31  12/9/2015 - Present Yes        ILD (interstitial lung disease) ([facility] (Chronic) ICD-10-CM: J84.9  ICD-9-CM: 846  8/27/2015 - Present Yes        Hx of BKA ([facility] ICD-10-CM: Z89.519  ICD-9-CM: V49.75  6/16/2015 - Present Yes        DM (diabetes mellitus) ([facility] ICD-10-CM: E11.9  ICD-9-CM: 250.00  6/16/2015 - Present Yes        Glaucoma (Chronic) ICD-10-CM: H40.9  ICD-9-CM: 365.9  12/29/2009 - Present Yes        Hypertension ICD-10-CM: I10  ICD-9-CM: 401.9  12/29/2009 - Present Yes    Overview Addendum 4/5/2019  3:43 PM by Hernesto Jackson LPN     Last Assessment & Plan:   Controlled on current regimen    Last Assessment & Plan:   Controlled on current regimen. ALDEN (Obstructive Sleep Apnea), uses home CPAP (Chronic) ICD-10-CM: G47.33  ICD-9-CM: 327.23  12/29/2009 - Present Yes                Hospital Course:  Mr. Caio Whatley is a 66 yo male with PMH of afib on eliquis, HTN, ALDEN on CPAP, diastolic CHF last EF 89-05% Aug 2019, ILD followed by 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  Also c/o cough which he has been seen for last week.  Cough is productive with white sputum with red streaks.  UA +UTI.  urine cx with E.coli. Started on rocephin and changed to oral cipro. BC 1/2 with dipthoids (I spoke to lab myself), given pt has been afebrile, not tachycardic and clinically improving, would consider this a contaminant. 9/15 CXR with mild interstitial edema, cardiomegaly, unchanged small left pleural effusion. Pt started on lasix IV.  9/16 CXR with worsening pulmonary edema.  9/17 CXR with clear lungs.  Pt found to be hypoxic with RA sats 87-88%, improved with 2 L O2 via NC. Now on RA. CT urogram without obstruction. Pt switched to oral lasix after worsening creatinine that is now trending down. Pt with RLE edema improved from admission. Denies CP, SOB. Diagnostic Study/Procedure results summary copied from within [facility] EMR:    CT ABDOMEN AND PELVIS     INDICATION:  Hematuria, UTI     Multiple axial images were obtained through the abdomen and pelvis without  intravenous or oral contrast.  Radiation dose reduction techniques were used for  this study: All CT scans performed at this facility use one or all of the  following: Automated exposure control, adjustment of the mA and/or kVp according  to patient's size, iterative reconstruction.     COMPARISON: 02/13/2017     FINDINGS:  -KIDNEYS/URETERS: The native kidneys are atrophic. There are vascular  calcifications, no hydronephrosis and no significant renal mass. There is also  a right lower quadrant renal transplant. There is no definite kidney stone. There is no hydronephrosis. -BLADDER: Normal.     -LUNG BASES: Small bilateral pleural effusions and bibasilar  infiltrate/atelectasis  -LIVER: Normal in size and appearance. -GALLBLADDER/BILE DUCTS: No gallstones or bile duct dilatation. -PANCREAS: Normal.  -SPLEEN: Normal.  -ADRENALS: Normal.     -REPRODUCTIVE ORGANS: No pelvic masses. -BOWEL: Normal caliber. No inflammatory changes. The appendix is normal in  size. -LYMPH NODES: No significant retroperitoneal, mesenteric, or pelvic adenopathy. -BONES: No fracture or significant bone lesion. -VASCULATURE: Normal  -OTHER: No ascites. Small umbilical hernia.     IMPRESSION  IMPRESSION:   1.  Stable appearance of the right lower quadrant renal transplant. No evidence  of obstruction.   2.  Bibasilar infiltrate/atelectasis and bilateral pleural effusions      EXAMINATION: CHEST RADIOGRAPH 9/15/2019 11:19 AM     ACCESSION NUMBER: [id number]     INDICATION: Shortness of breath, hypoxia     COMPARISON: Chest x-ray 6/4/2019     TECHNIQUE: A single AP view of the chest was obtained.      FINDINGS:      Support Lines and Tubes: None     Cardiac Silhouette: Unchanged pronounced cardiomegaly.     Lungs: Mild interstitial edema.     Pleura: Small left pleural effusion. No pneumothorax.     Osseous Structures: Prior median sternotomy.     Upper Abdomen: Unremarkable.        IMPRESSION  IMPRESSION:     1. Unchanged pronounced cardiomegaly.     2. Mild interstitial edema.     3. Unchanged small left pleural effusion.     VOICE DICTATED BY: Dr. Banks Sites: TEMPORARY     INDICATION: Respiratory failure     COMPARISON: 9/15/2019     FINDINGS: A portable AP radiograph of the chest was obtained at 0514 hours. The  patient is on a cardiac monitor. Bilateral airspace disease worse in the  interval. New bilateral pleural effusions. The cardiac and mediastinal contours  and pulmonary vascularity are normal.  The bones and soft tissues are grossly  within normal limits.      IMPRESSION  IMPRESSION: Pulmonary edema worse in the interval with new bilateral pleural  effusions. EXAM: TEMPORARY     INDICATION: Pulmonary edema     COMPARISON: 9/16/2019     FINDINGS: A portable AP radiograph of the chest was obtained at 0507 hours. The  patient is on a cardiac monitor. The lungs are clear. The cardiac and  mediastinal contours and pulmonary vascularity are normal.  The bones and soft  tissues are grossly within normal limits.      IMPRESSION  IMPRESSION: The lungs are now clear.     Labs: Results:       Chemistry Recent Labs     09/18/19  0634 09/17/19  0636 09/16/19  0633 09/15/19  1016   * 236* 154* 93    144 146* 148*   K 3.7 3.7 3.9 3.9   * 107 110* 110*   CO2 29 30 31 30   BUN 34* 32* 26* 28*   CREA 1.56* 1.62* 1.36 1.52*   CA 9.0 9.1 9.3 9.7   AGAP 7 7 5* 8   TBILI  --   --  0.5 0.4   ALT  --   --  13 16   AP  --   -- 60 67   TP  --   --  6.4 7.0   ALB  --   --  2.6* 3.0*   GLOB  --   --  3.8* 4.0*   AGRAT  --   --  0.7* 0.8*      CBC w/Diff Recent Labs     09/18/19  0634 09/17/19  0636 09/16/19  1314   WBC 8.8 9.9 11.5*   RBC 3.77* 3.84* 3.88*   HGB 10.6* 10.7* 11.0*   HCT 34.7* 35.4* 36.5*    239 233   GRANS 74 75 84*   LYMPH 14 13 5*   EOS 0* 1 0*      Cardiac Enzymes No results for input(s): CPK, CKND1, KEITH in the last 72 hours. No lab exists for component: CKRMB, TROIP   Coagulation Recent Labs     09/15/19  1016   PTP 15.6*   INR 1.2   APTT 29.0       Lipid Panel @BRIEFLAB(CHOL,CHOLPOCT,186936,[id number],CHOLX,CHOLP,CHLST,CHOLV,977101,HDL,HDLPOC,HDLPOCT,280560,NHDLCT,[id number],HDLC,HDLP,LDL,LDLPOCT,LDLCPOC,272270,NLDLCT,DLDL,LDLC,DLDLP,335745,VLDLC,VLDL,TGL,TGLX,TRIGL,[id number],TRIGP,TGLPOCT,280790,562504,CHHD,CHHDX)@   BNP No results for input(s): BNPP in the last 72 hours. Liver Enzymes Recent Labs     09/16/19  0633   TP 6.4   ALB 2.6*   AP 60   SGOT 13*      Thyroid Studies Lab Results   Component Value Date/Time    TSH 1.160 09/15/2019 10:16 AM            Discharge Exam:  Visit Vitals  /70   Pulse 64   Temp 98.3 °F (36.8 °C)   Resp 18   Ht 6' 2\" (1.88 m)   Wt 152.1 kg (335 lb 6.4 oz)   SpO2 93%   BMI 43.06 kg/m²     General:       NAD, cooperative, obese. Head:            Normocephalic, without obvious abnormality, atraumatic. Nose:            Nares normal. No drainage or sinus tenderness. Lungs:          CTA, resp even and nonlabored  Heart:            S1S2 present without murmurs rubs gallops. RRR. No LE edema  Left BKA. Abdomen:    Soft, non-tender. obese. Bowel sounds normal. No   Extremities: No cyanosis. Left BKA with prosthesis. Right great toe with large area of necrosis covered in betadine, right 2nd dose with small area of necrosis  Skin:             Texture, turgor normal. No rashes or lesions. Not Jaundiced  Neurologic:  Alert, oriented X3.  No focal deficits      Disposition: SNF  Discharge Condition: stable  Patient Instructions:   Current Discharge Medication List      START taking these medications    Details   guaiFENesin ER (MUCINEX) 600 mg ER tablet Take 1 Tab by mouth every twelve (12) hours. Qty: 20 Tab, Refills: 0      Lactobacillus Acidoph & Bulgar (FLORANEX) 1 million cell tab tablet Take 2 Tabs by mouth two (2) times a day. Qty: 14 Tab, Refills: 0      cephALEXin (KEFLEX) 500 mg capsule Take 1 Cap by mouth every six (6) hours for 6 days. Qty: 24 Cap, Refills: 0         CONTINUE these medications which have CHANGED    Details   furosemide (LASIX) 40 mg tablet Take 2 Tabs by mouth two (2) times a day. Qty: 60 Tab, Refills: 0      insulin glargine (LANTUS) 100 unit/mL injection 45 Units by SubCUTAneous route nightly. Qty: 1 Vial, Refills: 0      !! insulin lispro (HUMALOG) 100 unit/mL injection 12 Units by SubCUTAneous route Daily (before breakfast). Qty: 1 Vial, Refills: 0      !! insulin lispro (HUMALOG) 100 unit/mL injection 22 Units by SubCUTAneous route Daily (before lunch). Qty: 1 Vial, Refills: 0      !! insulin lispro (HUMALOG) 100 unit/mL injection 28 Units by SubCUTAneous route Daily (before dinner). Qty: 1 Vial, Refills: 0       !! - Potential duplicate medications found. Please discuss with provider. CONTINUE these medications which have NOT CHANGED    Details   donepezil (ARICEPT) 10 mg tablet Take 1 Tab by mouth nightly. Qty: 90 Tab, Refills: 4    Associated Diagnoses: Dementia without behavioral disturbance, unspecified dementia type      hydrALAZINE (APRESOLINE) 50 mg tablet Take 1 Tab by mouth two (2) times a day. Qty: 60 Tab, Refills: 11    Associated Diagnoses: Essential hypertension      clotrimazole-betamethasone (LOTRISONE) topical cream Apply  to affected area two (2) times a day. Qty: 30 g, Refills: 5      atorvastatin (LIPITOR) 40 mg tablet Take 1 Tab by mouth nightly.   Qty: 30 Tab, Refills: 5    Associated Diagnoses: Mixed hyperlipidemia      apixaban (ELIQUIS) 5 mg tablet Take 1 Tab by mouth two (2) times a day. Qty: 60 Tab, Refills: 11    Comments: NEED NEW RX  Associated Diagnoses: Paroxysmal atrial fibrillation (HCC)      aluminum hydrox-magnesium carb (GAVISCON)  mg/15 mL suspension Take 15 mL by mouth every six (6) hours as needed for Indigestion. albuterol (PROAIR HFA) 90 mcg/actuation inhaler Take 2 Puffs by inhalation every four (4) hours as needed for Wheezing. Qty: 1 Inhaler, Refills: 11    Associated Diagnoses: Wheezing      !! cycloSPORINE modified (NEORAL) 25 mg capsule Take 25 mg by mouth daily. EVERY MORNING      sotalol (BETAPACE) 120 mg tablet Take 1 Tab by mouth every twelve (12) hours. Qty: 180 Tab, Refills: 3    Associated Diagnoses: Paroxysmal atrial fibrillation (HCC)      dilTIAZem CD (CARDIZEM CD) 180 mg ER capsule Take 1 Cap by mouth daily. Qty: 31 Cap, Refills: 11    Associated Diagnoses: Paroxysmal atrial fibrillation ([address] Essential hypertension      aspirin delayed-release 81 mg tablet Take 81 mg by mouth daily. ALPHAGAN P 0.1 % ophthalmic solution Administer 1 Drop to right eye two (2) times a day. dorzolamide-timolol (COSOPT) 22.3-6.8 mg/mL ophthalmic solution Administer 1 Drop to both eyes two (2) times a day. glucose blood VI test strips (ONETOUCH ULTRA TEST) strip To check blood sugar 4 x per day. Dx E11.42      azelastine (ASTELIN) 137 mcg (0.1 %) nasal spray 1 [place] by Both Nostrils route two (2) times a day. Use in each nostril as directed  Qty: 1 Bottle, Refills: 11      NEXIUM 40 mg capsule TAKE 1 CAPSULE (40MG) BY ORAL ROUTE EVERY AM BEFORE A MEAL (INS WILL COVER 4/10/17)  Refills: 6      !! cycloSPORINE modified (NEORAL) 100 mg capsule Take 1 Cap by mouth two (2) times a day. HOLD TODAY'S (8/27/16) DOSES  Qty: 1 Cap, Refills: 0      ferrous sulfate 325 mg (65 mg iron) tablet Take 65 mg by mouth daily.  Indications: three times weekly MWF multivitamin (ONE A DAY) tablet Strength: Multiple Vitamins; Form: tablet; SIG: take 1 tab(s) orally once a day      polyethylene glycol (MIRALAX) 17 gram packet Take 17 g by mouth daily as needed. cpap machine kit by Does Not Apply route. 17 cm qhs      acetaminophen (TYLENOL EXTRA STRENGTH) 500 mg tablet Take 1,000 mg by mouth every six (6) hours as needed for Pain.      loratadine (CLARITIN) 10 mg tablet Take 10 mg by mouth daily as needed. mycophenolate sodium (MYFORTIC) 180 mg EC tablet Take 180 mg by mouth two (2) times a day. CALCIUM CARBONATE/VITAMIN D3 (CALCIUM 600 + D PO) Take 1 Tab by mouth daily. captopril (CAPOTEN) 50 mg tablet Take 50 mg by mouth three (3) times daily. predniSONE (DELTASONE) 10 mg tablet Take 10 mg by mouth daily (with breakfast). !! - Potential duplicate medications found. Please discuss with provider.           Activity: PT/OT Eval and Treat  Diet: Cardiac Diet and Diabetic Diet  Wound Care: Keep wound clean and dry and betadine paint, follows with Dr. Selene Dow      DNR   Surrogate decision maker: wife    Pneumonia and flu vaccine to be administered at discharge per hospital protocol     Follow-up  ·   FU PCP 2 days  ·  FU with wound care as scheduled  ·  DC to complete keflex course for UTI      Notes, labs, VS, diagnostic testing reviewed  Case discussed with pt, care team, wife, son and Dr. Leslie Mcpherson      Time spent to discharge patient 45 min    Signed:  Leora Frederick NP  9/18/2019  10:05 AM

## 2019-09-19 NOTE — PROGRESS NOTES
This note will not be viewable in [address] Patient discharged to [facility] on 9/18/19. ALIS outreach postponed for 21 days due to discharge to non-preferred network SNF.

## 2019-09-23 PROBLEM — Z12.11 SPECIAL SCREENING FOR MALIGNANT NEOPLASMS, COLON: Status: RESOLVED | Noted: 2018-01-01 | Resolved: 2019-01-01

## 2019-10-01 NOTE — CDMP QUERY
Patient admitted with UTI. Noted documentation of \"acute respiratory failure\" in H & P on 9/15 and on the d/c summary. Please document in progress notes clinical indicators (such as the ones below) to support this diagnosis - Respirations <12 or >25 - Air hunger/gasping - Use of accessory muscles of respiration/increased work of breathing - Sternal or intercostal retractions - Stridor - Inability to speak in full sentences - Cyanosis - Pulse ox <90% RA or <95% on O2  
- pH <7.35 or >7.45  
- pO2 < 60 mm Hg (or 10mm below COPD patient's baseline) - pCO2 >50mm Hg (or 10mm above COPD patient's baseline) Condition was evident because: ___________________________  
             (please document in your next progress note) »  Condition was ruled out and amended documentation provided in the medical record »  Clinically unable to provide additional clarity regarding the diagnosis »  Other (please specify)___________________________ »  Unknown The medical record reflects the following: 
   Risk Factors: Hx, ALDEN, CHF, Clinical Indicators: per documentation \"Patient was noted to have 02 sats of 87-88% that improved with 2L NC, Respirations was not less than 12 or greater than 25, per documented on H & P Lungs:          CTA, resp even and nonlabored\",  No ABG completed Treatment: 2Ln/c Thanks, Dianne Gonzalez RN Clinical  
[phone]

## 2019-10-02 PROBLEM — A49.1 INFECTION DUE TO STREPTOCOCCUS PNEUMONIAE: Status: RESOLVED | Noted: 2019-01-01 | Resolved: 2019-01-01

## 2019-10-02 NOTE — PROGRESS NOTES
Pt chart accessed due to CDMP query. Admitted for UTI, hx of renal transplant. DX UTI unspecified of transplanted kidney. Also admitted with acute hypoxic resp failure as evidenced by RA sats 87-88% on RA, drowsy, does not wear O2 at home, sats improved with O2.   
 
 
April Szymanski, NP

## 2019-10-10 NOTE — PROGRESS NOTES
This note will not be viewable in [address] Transition of Care Discharge Follow-up Questionnaire Date/Time of Call: 
 10/10/19 
 1241pm  
What was the patient hospitalized for? UTI Dc to [address] [place] [address] 9/18/19 Dc home 10/3/19 Does the patient understand his/her diagnosis and/or treatment and what happened during the hospitalization? Yes, spoke with patient, he states understanding of diagnosis and treatment; and is agreeable to call. Patient states he is doing well Did the patient receive discharge instructions? Yes   
CM Assessed Risk for Readmission:  
 
 
Patient stated Risk for Readmission: Low r/t diagnosis and/or comorbidities None stated Review any discharge instructions (see discharge instructions/AVS in [facility]). Ask patient if they understand these. Do they have any questions? Reviewed, understanding is stated, no questions at this time Were home services ordered (nursing, PT, OT, ST, etc.)? Patient states, Jules Liz looked into it but I didnt qualify If so, has the first visit occurred? If not, why? (Assist with coordination of services if necessary.) 
 N/A Was any DME ordered? No  
Patient has walker, wc, lift chair, cane If so, has it been received? If not, why?  (Assist patient in obtaining DME orders &/or equipment if necessary.) yes Complete a review of all medications (new, continued and discontinued meds per the D/C instructions and medication tab in [facility]). Completed Were all new prescriptions filled? If not, why?  (Assist patient in obtaining medications if necessary  escalate for CCM &/or SW if ongoing issues are verbalized by pt or anticipated) 
 patient states to have all needed medications Does the patient understand the purpose and dosing instructions for all medications? (If patient has questions, provide explanation and education.) Yes Does the patient have any problems in performing ADLs? (If patient is unable to perform ADLs  what is the limiting factor(s)? Do they have a support system that can assist? If no support system is present, discuss possible assistance that they may be able to obtain. Escalate for CCM/SW if ongoing issues are verbalized by pt or anticipated) Independent with ADLs Does the patient have all follow-up appointments scheduled? 7 day f/up with PCP?  
(f/up with PCP may be w/in 14 days if patient has a f/up with their specialist w/in 7 days) 7-14 day f/up with specialist?  
(or per discharge instructions) If f/up has not been made  what actions has the care coordinator made to accomplish this? Has transportation been arranged? Yes Dr. Leah Meneses attended visit 10/2/19, next visit 1/6/20 Yes, no transportation needs identified. Any other questions or concerns expressed by the patient? No other needs or concerns identified. Patient states his gratitude for follow up. Contact information for Care Coordinator was given, instructed to call with new questions or concerns. Schedule next appointment with RUSLAN Mathews or refer to RN Case Manager/ per the workflow guidelines. When is care coordinators next follow-up call scheduled? If referred for CCM  what RN care manager was the referral assigned? Patient is doing well, declines need for further follow up, will call if needs arise. ALIS Call Completed By: Paige Marx LPN Care Coordinator

## 2021-07-16 NOTE — PROCEDURE: PATHOLOGY BILLING
normal appearance Rendering Text In Billing: The slides were read, and reported in an attached document.